# Patient Record
Sex: FEMALE | URBAN - METROPOLITAN AREA
[De-identification: names, ages, dates, MRNs, and addresses within clinical notes are randomized per-mention and may not be internally consistent; named-entity substitution may affect disease eponyms.]

---
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
## 2023-01-10 ENCOUNTER — ATHLETIC TRAINING (OUTPATIENT)
Dept: SPORTS MEDICINE | Facility: OTHER | Age: 18
End: 2023-01-10

## 2023-01-10 DIAGNOSIS — M62.89 MUSCLE TIGHTNESS: Primary | ICD-10-CM

## 2023-01-10 NOTE — PROGRESS NOTES
AT Progress Note    Name: Julio Cesar Garcia  Age: 21 y o  Sport: [employer]  Date of Assessment: 1/10/2023    Assessment/Plan:   Visit Diagnosis: Bilateral medial tibial stress syndrome; Tight left hamstring    Treatment Plan: Continue preventative exercises   []  Follow-up PRN  []  Follow-up prior to next practice/game for re-evaluation  [x]  Daily treatment/rehab  Progress note expected weekly  Referral:   [x]  Not needed at this time  []  Referred to:     Subjective: Ath comes in for treatment of her left hamstring today  She states her shins feel good  Her left hamstring has been tight, and with the meet this weekend she wants to make sure it is 100% prior to competition  Objective:   No new objective measurements  Treatment Log:  Date:  1/10 12/7 12/1   Playing Status:   As tolerated         Exercise/Treatment      TENS and MHP 10min - 20min Lt hamstring   Cupping 10min     Hamstring active ROM completed - Completed      Ath will practice as tolerated today

## 2023-01-13 ENCOUNTER — ATHLETIC TRAINING (OUTPATIENT)
Dept: SPORTS MEDICINE | Facility: OTHER | Age: 18
End: 2023-01-13

## 2023-01-13 DIAGNOSIS — S86.899D MEDIAL TIBIAL STRESS SYNDROME, SUBSEQUENT ENCOUNTER: Primary | ICD-10-CM

## 2023-01-13 NOTE — PROGRESS NOTES
AT Progress Note    Name: Brit Martinez  Age: 21 y o  Sport: [employer]  Date of Assessment: 1/13/2023    Assessment/Plan:   Visit Diagnosis: Bilateral medial tibial stress syndrome    Treatment Plan: Continue preventative exercises   []  Follow-up PRN  []  Follow-up prior to next practice/game for re-evaluation  [x]  Daily treatment/rehab  Progress note expected weekly  Referral:   [x]  Not needed at this time  []  Referred to:     Subjective: Ath comes in for treatment today with competing in a meet this past weekend  She currently has no pain/discomfort  Objective:   No new objective measurements       Treatment Log:  Date:  1/13/23 12/7 12/1 11/30 11/28/22   Playing Status:   As tolerated             Exercise/Treatment        IASTM   5min B/L Shins - - 5min B/L plantar surface   TENS and MHP 20min - 20min Lt hamstring -    4way ankle  - - - 3x10    Line toe walks bent knee 5x with 10# 5x with 8# - 5x with 8# 5x with 8#   Lt posterior hip rotation  - - corrected -   DF self mobs 25x 25x 25x  25x 25x   Lunge soleus calf raises 3x10  3x10 3x10  3x10 3x10    Hamstring active ROM  - Completed  - completed   Wall toe lifts  - - 3x10 -   Hip hikes  - 2x15  2x15 -   Step ups with hip hike 2x15  2x15 - - -   SL pogos   - - - -   Squat jumps   - - - -

## 2023-01-17 ENCOUNTER — ATHLETIC TRAINING (OUTPATIENT)
Dept: SPORTS MEDICINE | Facility: OTHER | Age: 18
End: 2023-01-17

## 2023-01-17 DIAGNOSIS — S86.899D MEDIAL TIBIAL STRESS SYNDROME, SUBSEQUENT ENCOUNTER: Primary | ICD-10-CM

## 2023-01-17 NOTE — PROGRESS NOTES
AT Progress Note    Name: Nicola Em  Age: 21 y o  Sport: Track and [employer]  Date of Assessment: 1/17/2023    Assessment/Plan:   Visit Diagnosis: Bilateral medial tibial stress syndrome    Treatment Plan: Continue preventative exercises   []  Follow-up PRN  []  Follow-up prior to next practice/game for re-evaluation  [x]  Daily treatment/rehab  Progress note expected weekly  Referral:   [x]  Not needed at this time  []  Referred to:     Subjective: Ath comes in for treatment today with competing in a meet this past weekend  She currently has no pain/discomfort  She does have back tightness today  Objective:   No new objective measurements       Treatment Log:  Date:  1/17/23 1/13/23   Playing Status:          Exercise/Treatment     Cupping on back  5min     IASTM  5min B/L    TENS and MHP  20min   4way ankle     Line toe walks bent knee  5x with 10#   Lt posterior hip rotation     DF self mobs  25x   Lunge soleus calf raises  3x10    Hamstring active ROM     Wall toe lifts     Hip hikes     Step ups with hip hike  2x15    SL pogos      Squat jumps

## 2023-01-19 ENCOUNTER — ATHLETIC TRAINING (OUTPATIENT)
Dept: SPORTS MEDICINE | Facility: OTHER | Age: 18
End: 2023-01-19

## 2023-01-19 DIAGNOSIS — M62.89 MUSCLE TIGHTNESS: ICD-10-CM

## 2023-01-19 DIAGNOSIS — S86.899D MEDIAL TIBIAL STRESS SYNDROME, SUBSEQUENT ENCOUNTER: Primary | ICD-10-CM

## 2023-01-19 NOTE — PROGRESS NOTES
AT Progress Note    Name: Catia Lopez  Age: 21 y o  Sport: [employer]  Date of Assessment: 1/19/2023    Assessment/Plan:   Visit Diagnosis: Bilateral medial tibial stress syndrome    Treatment Plan: Continue preventative exercises   []  Follow-up PRN  []  Follow-up prior to next practice/game for re-evaluation  [x]  Daily treatment/rehab  Progress note expected weekly  Referral:   [x]  Not needed at this time  []  Referred to:     Subjective: Ath states her shins started bothering her yesterday and continued into today  Objective:   No new objective measurements       Treatment Log:  Date:  1/19/23 1/17/23 1/13/23   Playing Status:            Exercise/Treatment      Cupping on back  5min  5min     IASTM   5min B/L    TENS and MHP 20min  20min   4way ankle      Line toe walks bent knee   5x with 10#   Lt posterior hip rotation      DF self mobs   25x   Lunge soleus calf raises   3x10    Hamstring active ROM      Wall toe lifts      Hip hikes      Step ups with hip hike   2x15    SL pogos       Squat jumps

## 2023-01-23 ENCOUNTER — ATHLETIC TRAINING (OUTPATIENT)
Dept: SPORTS MEDICINE | Facility: OTHER | Age: 18
End: 2023-01-23

## 2023-01-23 DIAGNOSIS — M62.89 MUSCLE TIGHTNESS: ICD-10-CM

## 2023-01-23 DIAGNOSIS — S86.899D MEDIAL TIBIAL STRESS SYNDROME, SUBSEQUENT ENCOUNTER: Primary | ICD-10-CM

## 2023-01-23 NOTE — PROGRESS NOTES
AT Progress Note    Name: Sergo Boland  Age: 21 y o  Sport: Track and [employer]  Date of Assessment: 1/23/2023    Assessment/Plan:   Visit Diagnosis: Bilateral medial tibial stress syndrome    Treatment Plan: Continue preventative exercises   []  Follow-up PRN  []  Follow-up prior to next practice/game for re-evaluation  [x]  Daily treatment/rehab  Progress note expected weekly  Referral:   [x]  Not needed at this time  []  Referred to:     Subjective: Ath decided not to compete this past weekend because the track was small and she did not have enough space to warm up  Her shins have been bothering her but have remained at 2/10 pain level  She also states her back is still tight, but the cupping last appointment did help  Her back tightness is a 2/10       Objective:   Left posterior rotation- corrected     Treatment Log:  Date:  1/23/23 1/19/23 1/17/23 1/13/23   Playing Status:              Exercise/Treatment       Dead bugs  2x8      SL glute bridge 3x10      Bird dogs 3x10      Cat/cow 3x10      Cupping on back  - 5min  5min     IASTM  -  5min B/L    TENS and MHP - 20min  20min   4way ankle 3x10      Line toe walks bent knee -   5x with 10#   DF self mobs -   25x   Lunge soleus calf raises -   3x10    Hamstring active ROM -      Wall toe lifts -      Hip hikes 2x15       Step ups with hip hike -   2x15    SL pogos  -

## 2023-01-30 ENCOUNTER — ATHLETIC TRAINING (OUTPATIENT)
Dept: SPORTS MEDICINE | Facility: OTHER | Age: 18
End: 2023-01-30

## 2023-01-30 DIAGNOSIS — M62.89 MUSCLE TIGHTNESS: ICD-10-CM

## 2023-01-30 DIAGNOSIS — S86.899D MEDIAL TIBIAL STRESS SYNDROME, SUBSEQUENT ENCOUNTER: Primary | ICD-10-CM

## 2023-01-30 NOTE — PROGRESS NOTES
AT Progress Note    Name: Burley Castleman  Age: 21 y o  Sport: Track and [employer]  Date of Assessment: 1/30/2023    Assessment/Plan:   Visit Diagnosis: Bilateral medial tibial stress syndrome    Treatment Plan: Continue preventative exercises   []  Follow-up PRN  []  Follow-up prior to next practice/game for re-evaluation  [x]  Daily treatment/rehab  Progress note expected weekly  Referral:   [x]  Not needed at this time  []  Referred to:     Subjective: Ath had meet this past weekend, just jumped  Ath felt tightness in shins and back when coming in, pain was 2/10  Ath plans on running in meet this upcoming weekend, possibly not jumping  Not because of pain, but because of possible runway conditions  Objective:   No new objective measurements       Treatment Log:  Date:  1/30/23 1/23/23 1/19/23 1/17/23 1/13/23   Playing Status:                Exercise/Treatment        Dead bugs   2x8      SL glute bridge  3x10      Bird dogs  3x10      Cat/cow  3x10      Cupping on back  5min - 5min  5min     IASTM   -  5min B/L    TENS and MHP 20min  - 20min  20min   4way ankle 3x10 3x10      Line toe walks bent knee  -   5x with 10#   DF self mobs  -   25x   Lunge soleus calf raises  -   3x10    Hamstring active ROM  -      Wall toe lifts  -      Hip hikes 2x15 2x15       Step ups with hip hike  -   2x15    SL pomiss   -        Elzbieta Reaves, ATS

## 2023-02-03 ENCOUNTER — ATHLETIC TRAINING (OUTPATIENT)
Dept: SPORTS MEDICINE | Facility: OTHER | Age: 18
End: 2023-02-03

## 2023-02-03 DIAGNOSIS — M62.89 MUSCLE TIGHTNESS: ICD-10-CM

## 2023-02-03 DIAGNOSIS — S86.899D MEDIAL TIBIAL STRESS SYNDROME, SUBSEQUENT ENCOUNTER: Primary | ICD-10-CM

## 2023-02-03 NOTE — PROGRESS NOTES
AT Progress Note    Name: Francisco Lock  Age: 21 y o  Sport: [employer]  Date of Assessment: 2/3/2023    Assessment/Plan:   Visit Diagnosis: Bilateral medial tibial stress syndrome    Treatment Plan: Continue preventative exercises   []  Follow-up PRN  []  Follow-up prior to next practice/game for re-evaluation  [x]  Daily treatment/rehab  Progress note expected weekly  Referral:   [x]  Not needed at this time  []  Referred to:     Subjective: Ath came in feeling sore  She rated her shin pain a 3/10  Ath requested to do modalities today  Objective:   No new objective measurements       Treatment Log:  Date:  2/3/23 1/30/23 1/23/23 1/19/23 1/17/23 1/13/23   Playing Status:                  Exercise/Treatment         Dead bugs    2x8      SL glute bridge 1x10 (painful)  3x10      Bird dogs 3x10  3x10      Cat/cow   3x10      Cupping on back  5 min 5min - 5min  5min     IASTM    -  5min B/L    TENS and MHP 20 min 20min  - 20min  20min   4way ankle  3x10 3x10      Line toe walks bent knee   -   5x with 10#   DF self mobs   -   25x   Lunge soleus calf raises   -   3x10    Hamstring active ROM   -      Wall toe lifts   -      Hip hikes 2x15 2x15 2x15       Step ups with hip hike   -   2x15    W[place]it calf raise 3x10        2 leg glute bridge 2x10        SL pogos    -        Carmen Skeens, ATS

## 2023-02-06 ENCOUNTER — ATHLETIC TRAINING (OUTPATIENT)
Dept: SPORTS MEDICINE | Facility: OTHER | Age: 18
End: 2023-02-06

## 2023-02-06 DIAGNOSIS — S86.899D MEDIAL TIBIAL STRESS SYNDROME, SUBSEQUENT ENCOUNTER: Primary | ICD-10-CM

## 2023-02-06 NOTE — PROGRESS NOTES
AT Progress Note    Name: Penny Friend  Age: 21 y o  Sport: Track and [employer]  Date of Assessment: 2/6/2023    Assessment/Plan:   Visit Diagnosis: Bilateral medial tibial stress syndrome    Treatment Plan: Continue preventative exercises   []  Follow-up PRN  []  Follow-up prior to next practice/game for re-evaluation  [x]  Daily treatment/rehab  Progress note expected weekly  Referral:   [x]  Not needed at this time  []  Referred to:     Subjective: Ath came in feeling sore  She rated her shin pain a 3/10  Ath requested to do IASTM today  Objective:   No new objective measurements       Treatment Log:  Date:  2/6/23 2/3/23 1/30/23 1/23/23 1/19/23 1/17/23 1/13/23   Playing Status:                    Exercise/Treatment          Dead bugs     2x8      SL glute bridge  1x10 (painful)  3x10      Bird dogs  3x10  3x10      Cat/cow    3x10      Cupping on back   5 min 5min - 5min  5min     IASTM  5min B/L calves    -  5min B/L    TENS and MHP  20 min 20min  - 20min  20min   4way ankle   3x10 3x10      Line toe walks bent knee 3x10 10#   -   5x with 10#   DF self mobs    -   25x   Lunge soleus calf raises    -   3x10    Hamstring active ROM    -      Wall toe lifts    -      Hip hikes 2x15 2x15 2x15 2x15       Step ups with hip hike    -   2x15    W[place]it calf raise 3x10 3x10        2 leg glute bridge  2x10        SL pogos     -

## 2023-02-10 ENCOUNTER — ATHLETIC TRAINING (OUTPATIENT)
Dept: SPORTS MEDICINE | Facility: OTHER | Age: 18
End: 2023-02-10

## 2023-02-10 DIAGNOSIS — M62.89 MUSCLE TIGHTNESS: Primary | ICD-10-CM

## 2023-02-10 NOTE — PROGRESS NOTES
AT Initial Evaluation    Name: Winifred Bianchi  Age: 21 y o  Sport: [employer]  Date of Assessment: 2/10/2023    Assessment/Plan:   Visit Diagnosis: Neck tightness    Treatment Plan:   []  Follow-up PRN  []  Follow-up prior to next practice/game for re-evaluation  [x]  Daily treatment/rehab  Progress note expected weekly  Referral:   [x]  Not needed at this time  []  Referred to:       Subjective: Ath comes into [facility] for treatment of her neck  She states she can feel a knot at the top of her neck on the right side  She also feels tight, but denies pain       Objective:   ROM WNL no pain    Treatment Log:  Date:  2/10/23   Playing Status: Full go        Exercise/Treatment    Heat 10min   Effleruage 5min   Cupping 5min   Trap stretch 2x40s

## 2023-02-14 ENCOUNTER — ATHLETIC TRAINING (OUTPATIENT)
Dept: SPORTS MEDICINE | Facility: OTHER | Age: 18
End: 2023-02-14

## 2023-02-14 DIAGNOSIS — M62.89 MUSCLE TIGHTNESS: Primary | ICD-10-CM

## 2023-02-15 NOTE — PROGRESS NOTES
AT Progress Note    Name: Francisco Lock  Age: 21 y o  Sport: [employer]  Date of Assessment: 2/14/2023    Assessment/Plan:   Visit Diagnosis: Neck tightness    Treatment Plan:   []  Follow-up PRN  []  Follow-up prior to next practice/game for re-evaluation  [x]  Daily treatment/rehab  Progress note expected weekly  Referral:   [x]  Not needed at this time  []  Referred to:       Subjective: Ath comes into [facility] for treatment of her neck  She states it is feeling a lot better  Ath requests cupping today       Objective:   ROM WNL no pain    Treatment Log:  Date:  2/14/23 2/10/23   Playing Status: Full go  Full go         Exercise/Treatment     Heat 10min 10min   Effleruage 5min 5min   Cupping 5min 5min   Trap stretch 2x40s 2x40s

## 2023-02-26 ENCOUNTER — ATHLETIC TRAINING (OUTPATIENT)
Dept: SPORTS MEDICINE | Facility: OTHER | Age: 18
End: 2023-02-26

## 2023-02-26 DIAGNOSIS — M62.89 MUSCLE TIGHTNESS: Primary | ICD-10-CM

## 2023-02-26 NOTE — PROGRESS NOTES
AT Progress Note    Name: Ernestine Bull  Age: 21 y o  Sport: [employer]  Date of Assessment: 2/26/2023    Assessment/Plan:   Visit Diagnosis: Neck tightness    Treatment Plan:   []  Follow-up PRN  []  Follow-up prior to next practice/game for re-evaluation  [x]  Daily treatment/rehab  Progress note expected weekly  Referral:   [x]  Not needed at this time  []  Referred to:       Subjective: Ath comes into [facility] for treatment of her neck  She states it is feeling a lot better  Ath requests cupping today       Objective:   ROM WNL no pain    Treatment Log:  Date:  2/26/23 2/14/23 2/10/23   Playing Status: Full go  Full go  Full go          Exercise/Treatment      Heat 10min 10min 10min   Effleruage - 5min 5min   Cupping 5min 5min 5min   Trap stretch  2x40s 2x40s

## 2023-03-03 ENCOUNTER — ATHLETIC TRAINING (OUTPATIENT)
Dept: SPORTS MEDICINE | Facility: OTHER | Age: 18
End: 2023-03-03

## 2023-03-03 DIAGNOSIS — S86.891D RIGHT MEDIAL TIBIAL STRESS SYNDROME, SUBSEQUENT ENCOUNTER: Primary | ICD-10-CM

## 2023-03-03 NOTE — PROGRESS NOTES
AT Progress Note    Name: Francisco Lock  Age: 21 y o  Sport: [employer] and [employer]  Date of Assessment: 3/3/2023    Assessment/Plan:   Visit Diagnosis: Bilateral medial tibial stress syndrome    Treatment Plan: Continue preventative exercises   []  Follow-up PRN  []  Follow-up prior to next practice/game for re-evaluation  [x]  Daily treatment/rehab  Progress note expected weekly  Referral:   [x]  Not needed at this time  []  Referred to:     Subjective: Pt reported to the [facility] to cont treatment on their shins and arch  Pt stated her shins were bothersome after competing this past weekend, but does not currently have pain  Pt has had off all week  Objective:   No new objective measurements       Treatment Log:  Date:  3/3/23 2/6/23 2/3/23 1/30/23 1/23/23 1/19/23 1/17/23 1/13/23   Playing Status: As Tolerated                     Exercise/Treatment           Dead bugs      2x8      SL glute bridge   1x10 (painful)  3x10      Bird dogs   3x10  3x10      Cat/cow     3x10      Cupping on back    5 min 5min - 5min  5min     IASTM  5min anterior compartment 5min B/L calves    -  5min B/L    TENS and MHP   20 min 20min  - 20min  20min   4way ankle 3x10   3x10 3x10      Line toe walks bent knee 3x10 10# 3x10 10#   -   5x with 10#   DF self mobs completed    -   25x   Lunge soleus calf raises Completed    -   3x10    Hamstring active ROM     -      Wall toe lifts     -      Hip hikes  2x15 2x15 2x15 2x15       Step ups with hip hike     -   2x15    Wallsit calf raise  3x10 3x10        2 leg glute bridge   2x10        SL pogos      -

## 2023-03-13 ENCOUNTER — ATHLETIC TRAINING (OUTPATIENT)
Dept: SPORTS MEDICINE | Facility: OTHER | Age: 18
End: 2023-03-13

## 2023-03-13 DIAGNOSIS — S86.891D RIGHT MEDIAL TIBIAL STRESS SYNDROME, SUBSEQUENT ENCOUNTER: Primary | ICD-10-CM

## 2023-03-13 DIAGNOSIS — M62.89 MUSCLE TIGHTNESS: ICD-10-CM

## 2023-03-13 NOTE — PROGRESS NOTES
AT Progress Note    Name: Doc Lee  Age: 21 y o  Sport: [employer] and [employer]  Date of Assessment: 3/13/2023    Assessment/Plan:   Visit Diagnosis: Bilateral medial tibial stress syndrome; Neck tightness    Treatment Plan: Continue preventative exercises   []  Follow-up PRN  []  Follow-up prior to next practice/game for re-evaluation  [x]  Daily treatment/rehab  Progress note expected weekly  Referral:   [x]  Not needed at this time  []  Referred to:     Subjective: Pt reported to the [facility] to cont treatment on their shins and arch  She got new sneakers over spring break with insoles  She used them in a workout on Thursday and she didn't have any issues  She has not had any shin pain  She does state the right side of her neck still feels tight  She will be doing 4x1 and potentially the 100 for outdoor season  Objective:   No new objective measurements       Treatment Log:  Date:  3/13/23 3/3/23   Playing Status: Full go  As Tolerated        Exercise/Treatment     SL balance  jaswinder disc throwing ball 3x10    Lateral band walks Band around toes  Knees bent   3x    Dead bugs  -    SL glute bridge 2x15 on small step    Bird dogs -    Cat/cow -    Cupping on back  5min moving on right side neck    IASTM  - 5min anterior compartment   TENS and MHP -    4way ankle - 3x10   Line toe walks bent knee - 3x10 10#   DF self mobs - completed   Lunge soleus calf raises - Completed   Hamstring active ROM -    Wall toe lifts -    Hip hikes -    Step ups with hip hike -    Wallsit calf raise -    SL pogos  -

## 2023-03-17 ENCOUNTER — ATHLETIC TRAINING (OUTPATIENT)
Dept: SPORTS MEDICINE | Facility: OTHER | Age: 18
End: 2023-03-17

## 2023-03-17 DIAGNOSIS — S86.891D RIGHT MEDIAL TIBIAL STRESS SYNDROME, SUBSEQUENT ENCOUNTER: Primary | ICD-10-CM

## 2023-03-17 NOTE — PROGRESS NOTES
AT Progress Note    Name: Catia Lopez  Age: 21 y o  Sport: Track and [employer]  Date of Assessment: 3/17/2023    Assessment/Plan:   Visit Diagnosis: Bilateral medial tibial stress syndrome; Neck tightness    Treatment Plan: Continue preventative exercises   []  Follow-up PRN  []  Follow-up prior to next practice/game for re-evaluation  [x]  Daily treatment/rehab  Progress note expected weekly  Referral:   [x]  Not needed at this time  []  Referred to:     Subjective: Pt reported to the [facility] to cont treatment on their shins and arch  She got new sneakers over spring break with insoles  She used them in a workout on Thursday and she didn't have any issues  She has not had any shin pain  She does state the right side of her neck still feels tight  She will be doing 4x1 and potentially the 100 for outdoor season  Objective:   No new objective measurements       Treatment Log:  Date:  3/17/23 3/13/23 3/3/23   Playing Status: Full go  Full go  As Tolerated         Exercise/Treatment      TENS and MHP 20min shins -    Effleurage Neck 10min     DF self mobs Self 25x - completed

## 2023-03-19 ENCOUNTER — ATHLETIC TRAINING (OUTPATIENT)
Dept: SPORTS MEDICINE | Facility: OTHER | Age: 18
End: 2023-03-19

## 2023-03-19 DIAGNOSIS — M62.89 MUSCLE TIGHTNESS: Primary | ICD-10-CM

## 2023-03-19 NOTE — PROGRESS NOTES
Athletic Training Progress Note    Name: Zelda Almonte  Age: 21 y o  Assessment/Plan:     Visit Diagnosis: Neck tightness    Treatment Plan:     [x]  Follow-up PRN  []  Follow-up prior to next practice/game for re-evaluation  []  Daily treatment/rehab  Progress note expected weekly  Subjective: Ath comes in to [facility] [place] with c/o right sided neck tightness  Objective:   Palpate muscle knot along upper trapezius    Treatment Log:  Date: 3/19/23   Playing Status: As tolerated        Exercise/Treatment    MHP 10min   Effleurage  10min   Cupping 5min        Ath felt relief after her treatment today

## 2023-03-20 ENCOUNTER — ATHLETIC TRAINING (OUTPATIENT)
Dept: SPORTS MEDICINE | Facility: OTHER | Age: 18
End: 2023-03-20

## 2023-03-20 DIAGNOSIS — S86.891D RIGHT MEDIAL TIBIAL STRESS SYNDROME, SUBSEQUENT ENCOUNTER: Primary | ICD-10-CM

## 2023-03-20 NOTE — PROGRESS NOTES
AT Progress Note    Name: Sam Perez  Age: 21 y o  Sport: [employer]  Date of Assessment: 3/20/2023    Assessment/Plan:   Visit Diagnosis: Bilateral medial tibial stress syndrome; Neck tightness    Treatment Plan: Continue preventative exercises   []  Follow-up PRN  []  Follow-up prior to next practice/game for re-evaluation  [x]  Daily treatment/rehab  Progress note expected weekly  Referral:   [x]  Not needed at this time  []  Referred to:     Subjective: Pt reported to the [facility] to cont treatment on their shins and arch  Ath was able to complete a full week of sprints with minimal issues  Today she feels sore  Objective:   No new objective measurements       Treatment Log:  Date:  3/20/23 3/17/23 3/13/23 3/3/23   Playing Status: Full go  Full go  Full go  As Tolerated          Exercise/Treatment       TENS and MHP  20min shins -    Effleurage  Neck 10min     Wall sit soleus raises 3x10      Hip hikes  3x10      SL balance  Teresa disc throwing ball 3x10      IASTM 5min B/L      DF self mobs  Self 25x - completed

## 2023-03-24 ENCOUNTER — ATHLETIC TRAINING (OUTPATIENT)
Dept: SPORTS MEDICINE | Facility: OTHER | Age: 18
End: 2023-03-24

## 2023-03-24 DIAGNOSIS — S86.891D RIGHT MEDIAL TIBIAL STRESS SYNDROME, SUBSEQUENT ENCOUNTER: Primary | ICD-10-CM

## 2023-03-24 NOTE — PROGRESS NOTES
AT Progress Note    Name: Lobito Serrano  Age: 21 y o  Sport: [employer]  Date of Assessment: 3/24/2023    Assessment/Plan:   Visit Diagnosis: Bilateral medial tibial stress syndrome; Neck tightness    Treatment Plan: Continue preventative exercises   []  Follow-up PRN  []  Follow-up prior to next practice/game for re-evaluation  [x]  Daily treatment/rehab  Progress note expected weekly  Referral:   [x]  Not needed at this time  []  Referred to:     Subjective: Pt reported to the [facility] to cont treatment on their shins and arch  Ath is starting to feel the same type of pain that she felt before, especially after her workouts  She has been warming up on the turf  When they start doing more aggressive workouts, she will during endurance work on the bike       Objective:   LEFS: 16  Lt DF: 16deg  Rt DF: 18deg     Treatment Log:  Date:  3/24/23 3/20/23 3/17/23 3/13/23 3/3/23   Playing Status: Full go  Full go  Full go  Full go  As Tolerated           Exercise/Treatment        4 way ankle Green band        TENS and MHP 20min   20min shins -    Effleurage   Neck 10min     Wall sit soleus raises  3x10      Hip hikes   3x10      SL balance   Tereas disc throwing ball 3x10      IASTM  5min B/L      DF self mobs Self 25x   Self 25x - completed

## 2023-03-27 ENCOUNTER — ATHLETIC TRAINING (OUTPATIENT)
Dept: SPORTS MEDICINE | Facility: OTHER | Age: 18
End: 2023-03-27

## 2023-03-27 DIAGNOSIS — S86.891D RIGHT MEDIAL TIBIAL STRESS SYNDROME, SUBSEQUENT ENCOUNTER: Primary | ICD-10-CM

## 2023-03-27 NOTE — PROGRESS NOTES
AT Progress Note    Name: Thuy Barone  Age: 21 y o  Sport: [employer]  Date of Assessment: 3/27/2023    Assessment/Plan:   Visit Diagnosis: Bilateral medial tibial stress syndrome; Neck tightness    Treatment Plan: Continue preventative exercises   []  Follow-up PRN  []  Follow-up prior to next practice/game for re-evaluation  [x]  Daily treatment/rehab  Progress note expected weekly  Referral:   [x]  Not needed at this time  []  Referred to:     Subjective: Pt reported to the [facility] to cont treatment on their shins and arch  Ath is starting to feel the same type of pain that she felt before, especially after her workouts  She has been warming up on the turf  When they start doing more aggressive workouts, she will during endurance work on the bike  Ath noticed her shins didn't bother her this morning when she was walking down the stairs       Objective:   LEFS: 11  Lt DF: 16deg  Rt DF: 18deg     Treatment Log:  Date:  3/27/23 3/24/23 3/20/23 3/17/23 3/13/23 3/3/23   Playing Status:  Full go  Full go  Full go  Full go  As Tolerated            Exercise/Treatment         4 way ankle 2x10 blue band  Green band        TENS and MHP - 20min   20min shins -    Effleurage -   Neck 10min     Wall sit soleus raises 3x10  3x10      Hip hikes  2x8  3x10      SL balance  -  Teresa disc throwing ball 3x10      IASTM 3min B/L   5min B/L      DF self mobs  Self 25x   Self 25x - completed

## 2023-03-31 ENCOUNTER — ATHLETIC TRAINING (OUTPATIENT)
Dept: SPORTS MEDICINE | Facility: OTHER | Age: 18
End: 2023-03-31

## 2023-03-31 DIAGNOSIS — S86.891D RIGHT MEDIAL TIBIAL STRESS SYNDROME, SUBSEQUENT ENCOUNTER: Primary | ICD-10-CM

## 2023-03-31 NOTE — PROGRESS NOTES
AT Progress Note    Name: Shelby Espino  Age: 21 y o  Sport: Track and [employer]  Date of Assessment: 3/31/2023    Assessment/Plan:   Visit Diagnosis: Bilateral medial tibial stress syndrome; Neck tightness    Treatment Plan: Continue preventative exercises   []  Follow-up PRN  []  Follow-up prior to next practice/game for re-evaluation  [x]  Daily treatment/rehab  Progress note expected weekly  Referral:   [x]  Not needed at this time  []  Referred to:     Subjective: Pt reported to the [facility] to cont treatment on their shins and arch  Ath is starting to feel the same type of pain that she felt before, especially after her workouts  She has been warming up on the turf  When they start doing more aggressive workouts, she will during endurance work on the bike  Ath didn't have any pain with her workout yesterday       Objective:   LEFS: 11  Lt DF: 16deg  Rt DF: 18deg     Treatment Log:  Date:  3/31/23 3/27/23 3/24/23 3/20/23 3/17/23 3/13/23 3/3/23   Playing Status:   Full go  Full go  Full go  Full go  As Tolerated             Exercise/Treatment          4 way ankle  2x10 blue band  Green band        TENS and MHP 20min B/L shins - 20min   20min shins -    Effleurage  -   Neck 10min     Wall sit soleus raises  3x10  3x10      Hip hikes   2x8  3x10      SL balance   -  Teresa disc throwing ball 3x10      IASTM 5min B/L arches 3min B/L   5min B/L      DF self mobs   Self 25x   Self 25x - completed

## 2023-04-02 ENCOUNTER — ATHLETIC TRAINING (OUTPATIENT)
Dept: SPORTS MEDICINE | Facility: OTHER | Age: 18
End: 2023-04-02

## 2023-04-02 DIAGNOSIS — S86.891D RIGHT MEDIAL TIBIAL STRESS SYNDROME, SUBSEQUENT ENCOUNTER: Primary | ICD-10-CM

## 2023-04-02 NOTE — PROGRESS NOTES
AT Progress Note    Name: Raza Bean  Age: 21 y o  Sport: Track and [employer]  Date of Assessment: 4/2/2023    Assessment/Plan:   Visit Diagnosis: Bilateral medial tibial stress syndrome; Neck tightness    Treatment Plan: Continue preventative exercises   []  Follow-up PRN  []  Follow-up prior to next practice/game for re-evaluation  [x]  Daily treatment/rehab  Progress note expected weekly  Referral:   [x]  Not needed at this time  []  Referred to:     Subjective: Pt reported to the [facility] to cont treatment on their shins and arch  Ath is starting to feel the same type of pain that she felt before, especially after her workouts  She has been warming up on the turf  When they start doing more aggressive workouts, she will during endurance work on the bike       Objective:   LEFS: 11  Lt DF: 19deg  Rt DF: 18deg     Treatment Log:  Date:  4/2/23 3/31/23 3/27/23 3/24/23 3/20/23 3/17/23 3/13/23 3/3/23   Playing Status: As tolerated   Full go  Full go  Full go  Full go  As Tolerated              Exercise/Treatment           4 way ankle 3x10 blue band  2x10 blue band  Green band        Effleurage 5min B/L shins and PF  -   Neck 10min     Plantar fascia ball rolls 5min B/L          Hip hikes  3x10  2x8  3x10      DF self mobs 25x self   Self 25x   Self 25x - completed

## 2023-04-03 ENCOUNTER — ATHLETIC TRAINING (OUTPATIENT)
Dept: SPORTS MEDICINE | Facility: OTHER | Age: 18
End: 2023-04-03

## 2023-04-03 DIAGNOSIS — M62.89 MUSCLE TIGHTNESS: ICD-10-CM

## 2023-04-03 DIAGNOSIS — S86.891D RIGHT MEDIAL TIBIAL STRESS SYNDROME, SUBSEQUENT ENCOUNTER: Primary | ICD-10-CM

## 2023-04-03 NOTE — PROGRESS NOTES
AT Progress Note    Name: Connie Beckett  Age: 21 y o  Sport: Track and [employer]  Date of Assessment: 4/3/2023    Assessment/Plan:   Visit Diagnosis: Bilateral medial tibial stress syndrome; Neck tightness    Treatment Plan: Continue preventative exercises   []  Follow-up PRN  []  Follow-up prior to next practice/game for re-evaluation  [x]  Daily treatment/rehab  Progress note expected weekly  Referral:   [x]  Not needed at this time  []  Referred to:     Subjective: Pt reported to the [facility] to cont treatment on their shins and arch  Ath is starting to feel the same type of pain that she felt before, especially after her workouts  She has been warming up on the turf  When they start doing more aggressive workouts, she will during endurance work on the bike       Objective:   LEFS: 11  Lt DF: 19deg  Rt DF: 18deg     Treatment Log:  Date:  4/3/23 4/2/23 3/31/23 3/27/23 3/24/23 3/20/23 3/17/23 3/13/23 3/3/23   Playing Status: As tolerated As tolerated   Full go  Full go  Full go  Full go  As Tolerated               Exercise/Treatment            4 way ankle  3x10 blue band  2x10 blue band  Green band        Effleurage  5min B/L shins and PF  -   Neck 10min     Plantar fascia ball rolls  5min B/L          Hip hikes   3x10  2x8  3x10      Cupping 5min neck           Muscle relaxation TENS 20min with heat           DF self mobs  25x self   Self 25x   Self 25x - completed

## 2023-04-24 ENCOUNTER — ATHLETIC TRAINING (OUTPATIENT)
Dept: SPORTS MEDICINE | Facility: OTHER | Age: 18
End: 2023-04-24

## 2023-04-24 DIAGNOSIS — M62.89 MUSCLE TIGHTNESS: Primary | ICD-10-CM

## 2023-04-24 NOTE — PROGRESS NOTES
Athletic Training Progress Note     Name: Momo Erazo  Age: 21 y o    School District: [employer]  Sport: Track and field  Date of Assessment: 4/24/2023     Assessment/Plan:      Visit Diagnosis: Muscle tightness     Treatment Plan: decrease pain, increase mobility    []  Follow-up PRN  []  Follow-up prior to next practice/game for re-evaluation  [x]  Daily treatment/rehab  Progress note expected weekly  Referral:      []  Not needed at this time  []  Referred to:      [x]  Coaching staff notified  []  Parent/Guardian Notified      Subjective: Pt has no complaints  Objective:  See below         Treatment Log:     Date: 4/24/23 4/19/23 4/17/23   Playing Status: Full go Full go  Full go           Exercise/Treatment       Hamstring release 2x15 with ball  2x15 with ball    Slide board lunges 2x15     Glute bridge with march 3x10 green band      Nerve glides  2x15    MHP  -  completed   SL RDL  -  3x10   Ball squeeze with glute bridge and knee extension  2x5 B/L  2x5 B/L   Cupping    completed    Muscle relaxation TENS  15min with heat      IASTM Left hamstring

## 2023-05-03 ENCOUNTER — ATHLETIC TRAINING (OUTPATIENT)
Dept: SPORTS MEDICINE | Facility: OTHER | Age: 18
End: 2023-05-03

## 2023-05-03 DIAGNOSIS — M62.89 MUSCLE TIGHTNESS: Primary | ICD-10-CM

## 2023-05-03 NOTE — PROGRESS NOTES
Athletic Training Progress Note     Name: Mathew Venegas  Age: 21 y o    School District: [employer]  Sport: Track and field  Date of Assessment: 5/3/2023     Assessment/Plan:      Visit Diagnosis: Muscle tightness     Treatment Plan: decrease pain, increase mobility    []  Follow-up PRN  []  Follow-up prior to next practice/game for re-evaluation  [x]  Daily treatment/rehab  Progress note expected weekly  Referral:      []  Not needed at this time  []  Referred to:      [x]  Coaching staff notified  []  Parent/Guardian Notified      Subjective: Pt left hamstring is sore today  She will be competing Friday in [facility]  Objective:  See below         Treatment Log:     Date: 5/3/23 4/24/23 4/19/23  4/17/23   Playing Status: Full go Full go Full go  Full go            Exercise/Treatment        Elephant scoops 3x      SL glute bridges 3x10      Weighted hip extensions 3x10      Wall sit 2x45s      Effleurage 10min

## 2023-08-30 ENCOUNTER — ATHLETIC TRAINING (OUTPATIENT)
Dept: SPORTS MEDICINE | Facility: OTHER | Age: 18
End: 2023-08-30

## 2023-08-30 DIAGNOSIS — S86.899A MEDIAL TIBIAL STRESS SYNDROME, INITIAL ENCOUNTER: Primary | ICD-10-CM

## 2023-08-30 NOTE — PROGRESS NOTES
AT Initial Evaluation    Name: Vicente Salas  Age: 19 y.o. Sport:   Date of Assessment: 8/30/2023    Assessment/Plan:   Visit Diagnosis: No primary diagnosis found. Treatment Plan:   []  Follow-up PRN. []  Follow-up prior to next practice/game for re-evaluation. [x]  Daily treatment/rehab. Progress note expected weekly. Referral:   [x]  Not needed at this time  []  Referred to:       Subjective: Ath comes in to continue prevention exercises for shin splints. She bought new shoes with inserts and started wearing them for practice. Objective:   See below. Treatment Log:  Date:  8/30/23   Playing Status: Full go        Exercise/Treatment    4 way ankle 2x15 black band   Hip hikes 3x10   Ant tib raises 3x10    Soleus walks  5x 10#   SL balance 3x30s jaswinder disc         Ath will continue appointments regularly.

## 2023-09-06 ENCOUNTER — ATHLETIC TRAINING (OUTPATIENT)
Dept: SPORTS MEDICINE | Facility: OTHER | Age: 18
End: 2023-09-06

## 2023-09-06 DIAGNOSIS — S86.899A MEDIAL TIBIAL STRESS SYNDROME, INITIAL ENCOUNTER: Primary | ICD-10-CM

## 2023-09-07 NOTE — PROGRESS NOTES
Athletic Training Progress Note    Name: Emma Granados  Age: 19 y.o. Assessment/Plan:     Visit Diagnosis: Medial tibial stress syndrome, initial encounter [I08.835K]    Treatment Plan: See below    []  Follow-up PRN. []  Follow-up prior to next practice/game for re-evaluation. [x]  Daily treatment/rehab. Progress note expected weekly. Subjective: Ath reports to the clinic today to work on her shins. She states that the inserts are helping, but is worried that it will be temporary. She has not had very many problems. Objective:   n/a    Treatment Log:     Date: 9/6/23   Playing Status: Full go       Exercise/Treatment    3 way ankle stretch 3x45s   Anterior Tib stretch 3x45s                                          She also heated and received IASTM on the right side of her occiput. She will cont to make appointments.

## 2023-09-12 ENCOUNTER — ATHLETIC TRAINING (OUTPATIENT)
Dept: SPORTS MEDICINE | Facility: OTHER | Age: 18
End: 2023-09-12

## 2023-09-12 DIAGNOSIS — S86.899S MEDIAL TIBIAL STRESS SYNDROME, SEQUELA: Primary | ICD-10-CM

## 2023-09-12 NOTE — PROGRESS NOTES
Athletic Training Progress Note    Name: Mekhi Vazquez  Age: 19 y.o. Assessment/Plan:     Visit Diagnosis: MTSS    Treatment Plan: See below    []  Follow-up PRN. []  Follow-up prior to next practice/game for re-evaluation. [x]  Daily treatment/rehab. Progress note expected weekly. Subjective: Ath reports to the clinic today to work on her shins. She states that the inserts are helping, but is worried that it will be temporary. She has not had very many problems. Ath requests cupping on her low back and IASTM on B/L plantar fascia today.      Objective:   Left posterior rotation fixed with MET     Treatment Log:     Date: 9/12/23 9/6/23   Playing Status:  Full go        Exercise/Treatment     3 way ankle stretch  3x45s   Anterior Tib stretch  3x45s   Glute bridge with march 2x15 green band    Fire hydrants 2x15 green band     Cupping low back 5min    IASTM B/L plantar fascia 3min B/L

## 2023-09-26 ENCOUNTER — ATHLETIC TRAINING (OUTPATIENT)
Dept: SPORTS MEDICINE | Facility: OTHER | Age: 18
End: 2023-09-26

## 2023-09-26 DIAGNOSIS — M62.89 MUSCLE TIGHTNESS: Primary | ICD-10-CM

## 2023-09-26 NOTE — PROGRESS NOTES
S: Karol Younger states she is feeling good, her feet haven't bothered her and her shins haven't either. She does c/o back tightness. O: Bilateral back tightness  A: Muscle tightness  P: Ath completed 4way ankle, tib anterior activation, and cupping on low back with child pose positions. Her hips were corrected with MET.   BD, ATC

## 2023-10-11 ENCOUNTER — ATHLETIC TRAINING (OUTPATIENT)
Dept: SPORTS MEDICINE | Facility: OTHER | Age: 18
End: 2023-10-11

## 2023-10-11 DIAGNOSIS — S86.899S MEDIAL TIBIAL STRESS SYNDROME, SEQUELA: ICD-10-CM

## 2023-10-11 DIAGNOSIS — M62.89 MUSCLE TIGHTNESS: Primary | ICD-10-CM

## 2023-10-11 NOTE — PROGRESS NOTES
S: Jerman Obrien states she is feeling good, her feet haven't bothered her, but does state she has some soreness in her shins. She does c/o back tightness. O: Bilateral back tightness  A: Muscle tightness  P: Ath completed eccentric calf raises, tib anterior activation, back exercises including knee windshield wipers, prone extensions, IASTM on B/L shins, and cupping on low back with child pose positions.    BD, ATC

## 2023-10-15 ENCOUNTER — ATHLETIC TRAINING (OUTPATIENT)
Dept: SPORTS MEDICINE | Facility: OTHER | Age: 18
End: 2023-10-15

## 2023-10-15 DIAGNOSIS — S86.899S MEDIAL TIBIAL STRESS SYNDROME, SEQUELA: Primary | ICD-10-CM

## 2023-10-15 NOTE — PROGRESS NOTES
S: Niels Dyer states she feels her shin pain coming back. She feels it going up and down the stairs. She denies pain with walking. She states her right is worse than left. O: TTP distal tibia   A: Medial tibial stress syndrome   P: Treatment:   -CUS  - 2x15 tib anterior raises   - IASTM B/L plantar fascia    Completed CUS treatment for next 7 treatments with athlete.

## 2023-10-18 ENCOUNTER — ATHLETIC TRAINING (OUTPATIENT)
Dept: SPORTS MEDICINE | Facility: OTHER | Age: 18
End: 2023-10-18

## 2023-10-18 DIAGNOSIS — S86.899S MEDIAL TIBIAL STRESS SYNDROME, SEQUELA: Primary | ICD-10-CM

## 2023-10-22 ENCOUNTER — ATHLETIC TRAINING (OUTPATIENT)
Dept: SPORTS MEDICINE | Facility: OTHER | Age: 18
End: 2023-10-22

## 2023-10-22 DIAGNOSIS — S86.899S MEDIAL TIBIAL STRESS SYNDROME, SEQUELA: Primary | ICD-10-CM

## 2023-10-22 NOTE — PROGRESS NOTES
10/22/23  Completed CUS #3 and rehab exercises    10/18/23  CUS treatment #2 was completed followed by tib ant raises.

## 2023-10-24 ENCOUNTER — ATHLETIC TRAINING (OUTPATIENT)
Dept: SPORTS MEDICINE | Facility: OTHER | Age: 18
End: 2023-10-24

## 2023-10-24 DIAGNOSIS — S86.899S MEDIAL TIBIAL STRESS SYNDROME, SEQUELA: Primary | ICD-10-CM

## 2023-10-24 NOTE — PROGRESS NOTES
S: Hannah Ding states she feels her shin pain coming back. Ath states "I'm not bad, but I'm not good". Pain with going up and down the stairs. Ath has been cross training on her own. She denies pain with walking. She states her right is worse than left. O: TTP distal tibia   A: Medial tibial stress syndrome   P: Treatment:   -Foam houses   -Towel scrunches  -CUS #4  - 2x15 tib anterior raises   - IASTM B/L plantar fascia    Completed CUS treatment for next 7 treatments with athlete.

## 2023-11-02 ENCOUNTER — ATHLETIC TRAINING (OUTPATIENT)
Dept: SPORTS MEDICINE | Facility: OTHER | Age: 18
End: 2023-11-02

## 2023-11-02 DIAGNOSIS — S86.899S MEDIAL TIBIAL STRESS SYNDROME, SEQUELA: Primary | ICD-10-CM

## 2023-11-02 NOTE — PROGRESS NOTES
S: Judy Bhandari states she feels her shin pain coming back. Ath states "I'm not bad, but I'm not good". Pain with going up and down the stairs. Ath has been cross training on her own. She denies pain with walking. She states her right is worse than left. O: TTP distal tibia   A: Medial tibial stress syndrome   P: Treatment:   -Foam houses   -Towel scrunches  -CUS #5  - 2x15 tib anterior raises     Completed CUS treatment for next 7 treatments with athlete.

## 2023-11-05 ENCOUNTER — ATHLETIC TRAINING (OUTPATIENT)
Dept: SPORTS MEDICINE | Facility: OTHER | Age: 18
End: 2023-11-05

## 2023-11-05 DIAGNOSIS — S86.899S MEDIAL TIBIAL STRESS SYNDROME, SEQUELA: Primary | ICD-10-CM

## 2023-11-05 NOTE — PROGRESS NOTES
S: Love Mimes states she feels her shin pain coming back. Ath states "I'm not bad, but I'm not good". Pain with going up and down the stairs. Ath has been cross training on her own. She denies pain with walking. She states her right is worse than left. O: TTP distal tibia   A: Medial tibial stress syndrome   P: Treatment:   -4 way ankle black band  -DF Wall touches  -Towel scrunches  -CUS #6  - 2x15 tib anterior raises     Completed CUS treatment for next 7 treatments with athlete.

## 2023-11-07 ENCOUNTER — ATHLETIC TRAINING (OUTPATIENT)
Dept: SPORTS MEDICINE | Facility: OTHER | Age: 18
End: 2023-11-07

## 2023-11-07 DIAGNOSIS — S86.899S MEDIAL TIBIAL STRESS SYNDROME, SEQUELA: Primary | ICD-10-CM

## 2023-11-07 NOTE — PROGRESS NOTES
S: Stephanie Loomis states she feels her shin pain coming back. Ath states "I'm not bad, but I'm not good". Pain with going up and down the stairs. Ath has been cross training on her own. She denies pain with walking. She states her right is worse than left.   O: TTP distal tibia   A: Medial tibial stress syndrome   P: Treatment:   -CUS #7  -2x15 tib anterior raises  -Gastroc and soleus stretch  -Calf raises

## 2023-11-09 ENCOUNTER — ATHLETIC TRAINING (OUTPATIENT)
Dept: SPORTS MEDICINE | Facility: OTHER | Age: 18
End: 2023-11-09

## 2023-11-09 DIAGNOSIS — S86.899S MEDIAL TIBIAL STRESS SYNDROME, SEQUELA: Primary | ICD-10-CM

## 2023-11-09 DIAGNOSIS — M62.89 MUSCLE TIGHTNESS: ICD-10-CM

## 2023-11-09 NOTE — PROGRESS NOTES
11/9/23  S: Donette Harada comes in for treatment of her shins. Stairs are bothersome only going down. She requests heat and stim. O: TTP distal tibia   A: Medial tibial stress syndrome   P: Treatment: Muscle relaxation and heat, cupping on right upper trap    11/7/23  S: Donette Harada states she feels her shin pain coming back. Ath states "I'm not bad, but I'm not good". Pain with going up and down the stairs. Ath has been cross training on her own. She denies pain with walking. She states her right is worse than left.   O: TTP distal tibia   A: Medial tibial stress syndrome   P: Treatment:   -CUS #7  -2x15 tib anterior raises  -Gastroc and soleus stretch  -Calf raises

## 2023-11-15 ENCOUNTER — ATHLETIC TRAINING (OUTPATIENT)
Dept: SPORTS MEDICINE | Facility: OTHER | Age: 18
End: 2023-11-15

## 2023-11-15 DIAGNOSIS — S86.899S MEDIAL TIBIAL STRESS SYNDROME, SEQUELA: Primary | ICD-10-CM

## 2023-11-15 NOTE — PROGRESS NOTES
11/15/23  S: Simon Florentino comes in for treatment of her shins. Stairs are bothersome only going down. She requests heat and stim. O: TTP distal tibia   A: Medial tibial stress syndrome   P: Treatment: IASTM on B/L shins, ant tib activation. 11/9/23  S: Simon Florentino comes in for treatment of her shins. Stairs are bothersome only going down. She requests heat and stim. O: TTP distal tibia   A: Medial tibial stress syndrome   P: Treatment: Muscle relaxation and heat, cupping on right upper trap    11/7/23  S: Simon Florentino states she feels her shin pain coming back. Ath states "I'm not bad, but I'm not good". Pain with going up and down the stairs. Ath has been cross training on her own. She denies pain with walking. She states her right is worse than left.   O: TTP distal tibia   A: Medial tibial stress syndrome   P: Treatment:   -CUS #7  -2x15 tib anterior raises  -Gastroc and soleus stretch  -Calf raises

## 2023-11-16 ENCOUNTER — ATHLETIC TRAINING (OUTPATIENT)
Dept: SPORTS MEDICINE | Facility: OTHER | Age: 18
End: 2023-11-16

## 2023-11-16 DIAGNOSIS — S86.899S MEDIAL TIBIAL STRESS SYNDROME, SEQUELA: Primary | ICD-10-CM

## 2023-11-16 NOTE — PROGRESS NOTES
11/16/23  S: Coleman comes in for treatment of her shins. Stairs are bothersome. She describes going up stairs as "burning" and going down as just painful. She is wearing compression sleeves for practices. Ath will try arch taping for practices. O: TTP distal tibia   A: Medial tibial stress syndrome   P: DF wall touches, Soleus raises with weighted vest, Foam houses, Great toe lifts, Toe spreads    11/15/23  S: Jahaira Cruz comes in for treatment of her shins. Stairs are bothersome only going down. She requests heat and stim. O: TTP distal tibia   A: Medial tibial stress syndrome   P: Treatment: IASTM on B/L shins, ant tib activation. 11/9/23  S: Jahaira Cruz comes in for treatment of her shins. Stairs are bothersome only going down. She requests heat and stim. O: TTP distal tibia   A: Medial tibial stress syndrome   P: Treatment: Muscle relaxation and heat, cupping on right upper trap    11/7/23  S: Jahaira Cruz states she feels her shin pain coming back. Ath states "I'm not bad, but I'm not good". Pain with going up and down the stairs. Ath has been cross training on her own. She denies pain with walking. She states her right is worse than left.   O: TTP distal tibia   A: Medial tibial stress syndrome   P: Treatment:   -CUS #7  -2x15 tib anterior raises  -Gastroc and soleus stretch  -Calf raises

## 2023-11-21 ENCOUNTER — ATHLETIC TRAINING (OUTPATIENT)
Dept: SPORTS MEDICINE | Facility: OTHER | Age: 18
End: 2023-11-21

## 2023-11-21 DIAGNOSIS — S86.899S MEDIAL TIBIAL STRESS SYNDROME, SEQUELA: Primary | ICD-10-CM

## 2023-11-21 DIAGNOSIS — M62.89 MUSCLE TIGHTNESS: ICD-10-CM

## 2023-11-21 NOTE — PROGRESS NOTES
11/21/23  S: Ath requests cupping on her back and IASTM on plantar surfaces. Ath states the arch taping helps her feet from "burning" after her warmup laps. O: TTP distal tibia   A: Medial tibial stress syndrome   P: Great toe lifts, Toe spreads, Cupping on back, IASTM on plantar surfaces     11/16/23  S: Cinthya Sánchez comes in for treatment of her shins. Stairs are bothersome. She describes going up stairs as "burning" and going down as just painful. She is wearing compression sleeves for practices. Ath will try arch taping for practices. O: TTP distal tibia   A: Medial tibial stress syndrome   P: DF wall touches, Soleus raises with weighted vest, Foam houses, Great toe lifts, Toe spreads    11/15/23  S: Cinthya Sánchez comes in for treatment of her shins. Stairs are bothersome only going down. She requests heat and stim. O: TTP distal tibia   A: Medial tibial stress syndrome   P: Treatment: IASTM on B/L shins, ant tib activation. 11/9/23  S: Cinthya Sánchez comes in for treatment of her shins. Stairs are bothersome only going down. She requests heat and stim. O: TTP distal tibia   A: Medial tibial stress syndrome   P: Treatment: Muscle relaxation and heat, cupping on right upper trap    11/7/23  S: Cinthya Sánchez states she feels her shin pain coming back. Ath states "I'm not bad, but I'm not good". Pain with going up and down the stairs. Ath has been cross training on her own. She denies pain with walking. She states her right is worse than left.   O: TTP distal tibia   A: Medial tibial stress syndrome   P: Treatment:   -CUS #7  -2x15 tib anterior raises  -Gastroc and soleus stretch  -Calf raises

## 2023-11-27 ENCOUNTER — ATHLETIC TRAINING (OUTPATIENT)
Dept: SPORTS MEDICINE | Facility: OTHER | Age: 18
End: 2023-11-27

## 2023-11-27 DIAGNOSIS — S86.899S MEDIAL TIBIAL STRESS SYNDROME, SEQUELA: Primary | ICD-10-CM

## 2023-11-27 NOTE — PROGRESS NOTES
11/27/23  S: Ath completed practice indoors on Tuesday and didn't have pain with that. She felt it slightly, but nothing that she wasn't able to finish the practice. She has been lowering her reps towards the end of the week and bikes on Fridays. O: TTP left mid tibia. TTP right distal tibia   A: Medial tibial stress syndrome   P: Banded toe steps, hip hikes with weight, CUS completed    11/21/23  S: Ath requests cupping on her back and IASTM on plantar surfaces. Ath states the arch taping helps her feet from "burning" after her warmup laps. O: TTP distal tibia   A: Medial tibial stress syndrome   P: Great toe lifts, Toe spreads, Cupping on back, IASTM on plantar surfaces     11/16/23  S: Stephanie Loomis comes in for treatment of her shins. Stairs are bothersome. She describes going up stairs as "burning" and going down as just painful. She is wearing compression sleeves for practices. Ath will try arch taping for practices. O: TTP distal tibia   A: Medial tibial stress syndrome   P: DF wall touches, Soleus raises with weighted vest, Foam houses, Great toe lifts, Toe spreads    11/15/23  S: Stephanie Loomis comes in for treatment of her shins. Stairs are bothersome only going down. She requests heat and stim. O: TTP distal tibia   A: Medial tibial stress syndrome   P: Treatment: IASTM on B/L shins, ant tib activation. 11/9/23  S: Stephanie Loomis comes in for treatment of her shins. Stairs are bothersome only going down. She requests heat and stim. O: TTP distal tibia   A: Medial tibial stress syndrome   P: Treatment: Muscle relaxation and heat, cupping on right upper trap    11/7/23  S: Stephanie Loomis states she feels her shin pain coming back. Ath states "I'm not bad, but I'm not good". Pain with going up and down the stairs. Ath has been cross training on her own. She denies pain with walking. She states her right is worse than left.   O: TTP distal tibia   A: Medial tibial stress syndrome   P: Treatment:   -CUS #7  -2x15 tib anterior raises  -Gastroc and soleus stretch  -Calf raises

## 2023-11-29 ENCOUNTER — ATHLETIC TRAINING (OUTPATIENT)
Dept: SPORTS MEDICINE | Facility: OTHER | Age: 18
End: 2023-11-29

## 2023-11-29 DIAGNOSIS — S86.899S MEDIAL TIBIAL STRESS SYNDROME, SEQUELA: Primary | ICD-10-CM

## 2023-11-29 NOTE — PROGRESS NOTES
11/29/23  S: Ath requests cupping along her back and heat/stim on both shins. O: TTP left mid tibia. TTP right distal tibia   A: Medial tibial stress syndrome   P: Cupping on back/neck, muscle relaxation TENS 20min with heat    11/28/23  S: Ath completed practice indoors on Tuesday and didn't have pain with that. She felt it slightly, but nothing that she wasn't able to finish the practice. She has been lowering her reps towards the end of the week and bikes on Fridays. O: TTP left mid tibia. TTP right distal tibia   A: Medial tibial stress syndrome   P: Banded toe steps, hip hikes with weight, CUS completed    11/27/23  S: Carolina Hopkins completed practice indoors on Tuesday and didn't have pain with that. She felt it slightly, but nothing that she wasn't able to finish the practice. She has been lowering her reps towards the end of the week and bikes on Fridays. O: TTP left mid tibia. TTP right distal tibia   A: Medial tibial stress syndrome   P: Banded toe steps, hip hikes with weight, CUS completed    11/21/23  S: Ath requests cupping on her back and IASTM on plantar surfaces. Ath states the arch taping helps her feet from "burning" after her warmup laps. O: TTP distal tibia   A: Medial tibial stress syndrome   P: Great toe lifts, Toe spreads, Cupping on back, IASTM on plantar surfaces     11/16/23  S: Carolina Hopkins comes in for treatment of her shins. Stairs are bothersome. She describes going up stairs as "burning" and going down as just painful. She is wearing compression sleeves for practices. Ath will try arch taping for practices. O: TTP distal tibia   A: Medial tibial stress syndrome   P: DF wall touches, Soleus raises with weighted vest, Foam houses, Great toe lifts, Toe spreads    11/15/23  S: Carolina Hopkins comes in for treatment of her shins. Stairs are bothersome only going down. She requests heat and stim.    O: TTP distal tibia   A: Medial tibial stress syndrome   P: Treatment: IASTM on B/L shins, ant tib activation. 11/9/23  S: Valarie Bruno comes in for treatment of her shins. Stairs are bothersome only going down. She requests heat and stim. O: TTP distal tibia   A: Medial tibial stress syndrome   P: Treatment: Muscle relaxation and heat, cupping on right upper trap    11/7/23  S: Valarie Bruno states she feels her shin pain coming back. Ath states "I'm not bad, but I'm not good". Pain with going up and down the stairs. Ath has been cross training on her own. She denies pain with walking. She states her right is worse than left.   O: TTP distal tibia   A: Medial tibial stress syndrome   P: Treatment:   -CUS #7  -2x15 tib anterior raises  -Gastroc and soleus stretch  -Calf raises

## 2023-12-04 ENCOUNTER — ATHLETIC TRAINING (OUTPATIENT)
Dept: SPORTS MEDICINE | Facility: OTHER | Age: 18
End: 2023-12-04

## 2023-12-04 DIAGNOSIS — S86.899S MEDIAL TIBIAL STRESS SYNDROME, SEQUELA: Primary | ICD-10-CM

## 2023-12-04 NOTE — PROGRESS NOTES
Athletic Training Progress Note    Name: Mee Irizarry  Age: 19 y.o. Assessment/Plan:     Visit Diagnosis: Medial tibial stress syndrome     Treatment Plan:     []  Follow-up PRN. []  Follow-up prior to next practice/game for re-evaluation. [x]  Daily treatment/rehab. Progress note expected weekly. Subjective: Friday during pre-meet ath states she was feeling pain in her shins. Normally on Friday's she does elliptical, so she doesn't think her body was used to the extra day of sprints. Saturday she states she was more sore. Her pain did not go above a 4/10.      Objective:  Left anterior rotation corrected with MET     Treatment Log:  Date: 12/4/23   Playing Status: Full go        Exercise/Treatment    [place] pick ups 1x each foot   IASTM B/L shins   IASTM  B/L plantar surfaces

## 2023-12-06 ENCOUNTER — ATHLETIC TRAINING (OUTPATIENT)
Dept: SPORTS MEDICINE | Facility: OTHER | Age: 18
End: 2023-12-06

## 2023-12-06 DIAGNOSIS — M62.89 MUSCLE TIGHTNESS: Primary | ICD-10-CM

## 2023-12-06 NOTE — PROGRESS NOTES
Athletic Training Progress Note    Name: Sue Thornton  Age: 19 y.o. Assessment/Plan:     Visit Diagnosis: Medial tibial stress syndrome     Treatment Plan:     []  Follow-up PRN. []  Follow-up prior to next practice/game for re-evaluation. [x]  Daily treatment/rehab. Progress note expected weekly. Subjective: Friday during pre-meet ath states she was feeling pain in her shins. Normally on Friday's she does elliptical, so she doesn't think her body was used to the extra day of sprints. Saturday she states she was more sore. Her pain did not go above a 4/10. Ath requests cupping today for her low back. Objective:  No hip misalignment.      Treatment Log:  Date: 12/6/23 12/4/23   Playing Status: Full go  Full go         Exercise/Treatment     [place] 3x10     Tail wags 2x10     Cupping 5min low back

## 2024-01-21 ENCOUNTER — ATHLETIC TRAINING (OUTPATIENT)
Dept: SPORTS MEDICINE | Facility: OTHER | Age: 19
End: 2024-01-21

## 2024-01-21 DIAGNOSIS — S86.899S MEDIAL TIBIAL STRESS SYNDROME, SEQUELA: Primary | ICD-10-CM

## 2024-01-21 NOTE — PROGRESS NOTES
Athletic Training Progress Note    Name: Wayne Pina  Age: 18 y.o.     Assessment/Plan:     Visit Diagnosis: Medial tibial stress syndrome     Treatment Plan:     []  Follow-up PRN.   []  Follow-up prior to next practice/game for re-evaluation.  [x]  Daily treatment/rehab. Progress note expected weekly.     Subjective: Ath had a meet yesterday and states her shin pain has increased. She feels shin pain with walking around.     Objective:  See below.     Treatment Log:  Date: 1/21/23   Playing Status:        Exercise/Treatment    KAV[place] arches 3min    Heat + Stim 20min

## 2024-01-23 ENCOUNTER — ATHLETIC TRAINING (OUTPATIENT)
Dept: SPORTS MEDICINE | Facility: OTHER | Age: 19
End: 2024-01-23

## 2024-01-23 DIAGNOSIS — S86.899S MEDIAL TIBIAL STRESS SYNDROME, SEQUELA: Primary | ICD-10-CM

## 2024-01-23 NOTE — PROGRESS NOTES
Athletic Training Progress Note    Name: Wayne Pina  Age: 18 y.o.     Assessment/Plan:     Visit Diagnosis: Medial tibial stress syndrome     Treatment Plan:   []  Follow-up PRN.   []  Follow-up prior to next practice/game for re-evaluation.  [x]  Daily treatment/rehab. Progress note expected weekly.     Subjective: Ath states her shin pain has decreased. She states her pain is 1/10.     Objective:  See below.     Treatment Log:  Date: 1/23/24 1/21/24   Playing Status: As tolerated         Exercise/Treatment     4 way ankle 30x black band    Seated tib raises 3x10    Foam pick ups     IASTM arches  3min    Heat + Stim  20min      Arch taping for practice.

## 2024-01-30 ENCOUNTER — ATHLETIC TRAINING (OUTPATIENT)
Dept: SPORTS MEDICINE | Facility: OTHER | Age: 19
End: 2024-01-30

## 2024-01-30 DIAGNOSIS — S86.899S MEDIAL TIBIAL STRESS SYNDROME, SEQUELA: Primary | ICD-10-CM

## 2024-01-30 NOTE — PROGRESS NOTES
Athletic Training Progress Note    Name: Wayne Pina  Age: 18 y.o.     Assessment/Plan:     Visit Diagnosis: Medial tibial stress syndrome     Treatment Plan:   []  Follow-up PRN.   []  Follow-up prior to next practice/game for re-evaluation.  [x]  Daily treatment/rehab. Progress note expected weekly.     Subjective: Ath states her shin pain has decreased. She states her pain is 1/10.     Objective:  See below.     Treatment Log:  Date: 1/30/24 1/23/24 1/21/24   Playing Status: As tolerated  As tolerated          Exercise/Treatment      4 way ankle  30x black band    Seated tib raises  3x10    Foam pick ups      IASTM arches 3min  3min    Ice cup 5min B/L      Heat + Stim   20min      Arch taping for practice.

## 2024-02-06 ENCOUNTER — ATHLETIC TRAINING (OUTPATIENT)
Dept: SPORTS MEDICINE | Facility: OTHER | Age: 19
End: 2024-02-06

## 2024-02-06 DIAGNOSIS — M79.10 MUSCLE SORENESS: Primary | ICD-10-CM

## 2024-02-06 NOTE — PROGRESS NOTES
2/6/24  Ath requests IASTM on both arches, and cupping on her back. She is not competing this weekend.   Completed: IASTM, cupping on back 5min

## 2024-02-15 ENCOUNTER — ATHLETIC TRAINING (OUTPATIENT)
Dept: SPORTS MEDICINE | Facility: OTHER | Age: 19
End: 2024-02-15

## 2024-02-15 DIAGNOSIS — S86.899S MEDIAL TIBIAL STRESS SYNDROME, SEQUELA: Primary | ICD-10-CM

## 2024-02-15 DIAGNOSIS — M79.10 MUSCLE SORENESS: ICD-10-CM

## 2024-02-15 NOTE — PROGRESS NOTES
2/15/24  Ath states she is doing moderately better. Treatment consisted of IASTM on both arches, muscle relaxation on shins, and release on neck musculature.       2/6/24  Ath requests IASTM on both arches, and cupping on her back. She is not competing this weekend.   Completed: IASTM, cupping on back 5min

## 2024-02-20 ENCOUNTER — ATHLETIC TRAINING (OUTPATIENT)
Dept: SPORTS MEDICINE | Facility: OTHER | Age: 19
End: 2024-02-20

## 2024-02-20 DIAGNOSIS — S86.899S MEDIAL TIBIAL STRESS SYNDROME, SEQUELA: Primary | ICD-10-CM

## 2024-02-20 NOTE — PROGRESS NOTES
2/20/24  Ath states she is doing moderately better. Treatment consisted of muscle relaxation on shins with heat.    2/15/24  Ath states she is doing moderately better. Treatment consisted of IASTM on both arches, muscle relaxation on shins, and release on neck musculature.       2/6/24  Ath requests IASTM on both arches, and cupping on her back. She is not competing this weekend.   Completed: IASTM, cupping on back 5min

## 2024-02-21 ENCOUNTER — ATHLETIC TRAINING (OUTPATIENT)
Dept: SPORTS MEDICINE | Facility: OTHER | Age: 19
End: 2024-02-21
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
## 2024-02-21 NOTE — PROGRESS NOTES
2/21/24  Treatment consisted of: Hip hikes, Wall sit tib raises, Cupping on upper back.   BD ATC    2/20/24  Ath states she is doing moderately better. Treatment consisted of muscle relaxation on shins with heat.    2/15/24  Ath states she is doing moderately better. Treatment consisted of IASTM on both arches, muscle relaxation on shins, and release on neck musculature.       2/6/24  Ath requests IASTM on both arches, and cupping on her back. She is not competing this weekend.   Completed: IASTM, cupping on back 5min

## 2024-02-27 ENCOUNTER — ATHLETIC TRAINING (OUTPATIENT)
Dept: SPORTS MEDICINE | Facility: OTHER | Age: 19
End: 2024-02-27

## 2024-02-27 DIAGNOSIS — S86.899S MEDIAL TIBIAL STRESS SYNDROME, SEQUELA: Primary | ICD-10-CM

## 2024-02-27 NOTE — PROGRESS NOTES
2/27  Ath requests IASTM on both arches, and heat with electric stim on medial tibias. 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 Ath reported doing better and is still completing previously prescribed maintenance rehab exercises on her own.  Completed:IASTM on both arches followed by heat and electric stim for 20 minutes.   PM ATS  CY LAT ATC      2/20/24  Ath states she is doing moderately better. Treatment consisted of muscle relaxation on shins with heat.    2/15/24  Ath states she is doing moderately better. Treatment consisted of IASTM on both arches, muscle relaxation on shins, and release on neck musculature.       2/6/24  Ath requests IASTM on both arches, and cupping on her back. She is not competing this weekend.   Completed: IASTM, cupping on back 5min

## 2024-02-28 ENCOUNTER — ATHLETIC TRAINING (OUTPATIENT)
Dept: SPORTS MEDICINE | Facility: OTHER | Age: 19
End: 2024-02-28

## 2024-02-28 DIAGNOSIS — M79.10 MUSCLE SORENESS: Primary | ICD-10-CM

## 2024-02-29 NOTE — PROGRESS NOTES
2/28  Ath completed back squats for the first time since November and states her upper back is sore/tight.   Completed: IASTM on both shins, cupping on upper back     2/27  Ath requests IASTM on both arches, and heat with electric stim on medial tibias. 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 Ath reported doing better and is still completing previously prescribed maintenance rehab exercises on her own.  Completed:IASTM on both arches followed by heat and electric stim for 20 minutes.   PM ATS  CY LAT ATC      2/20/24  Ath states she is doing moderately better. Treatment consisted of muscle relaxation on shins with heat.    2/15/24  Ath states she is doing moderately better. Treatment consisted of IASTM on both arches, muscle relaxation on shins, and release on neck musculature.       2/6/24  Ath requests IASTM on both arches, and cupping on her back. She is not competing this weekend.   Completed: IASTM, cupping on back 5min

## 2024-03-25 ENCOUNTER — ATHLETIC TRAINING (OUTPATIENT)
Dept: SPORTS MEDICINE | Facility: OTHER | Age: 19
End: 2024-03-25

## 2024-03-25 DIAGNOSIS — S86.899S MEDIAL TIBIAL STRESS SYNDROME, SEQUELA: Primary | ICD-10-CM

## 2024-03-25 NOTE — PROGRESS NOTES
"3/25/24  Ath comes in for treatment of B/L shins. She states they have both felt great and doesn't describe it as pain, but sometimes \"burning\".   Today she completed: Hip hikes, SL balance jaswinder disc, [place] pick ups 2x, IASTM on B/L arches  BD ATC   "

## 2024-03-26 ENCOUNTER — ATHLETIC TRAINING (OUTPATIENT)
Dept: SPORTS MEDICINE | Facility: OTHER | Age: 19
End: 2024-03-26

## 2024-03-26 DIAGNOSIS — S86.899S MEDIAL TIBIAL STRESS SYNDROME, SEQUELA: Primary | ICD-10-CM

## 2024-03-26 NOTE — PROGRESS NOTES
"3/26/24  Ath comes in for TENS and heat on B/L shins.   BD     3/25/24  Ath comes in for treatment of B/L shins. She states they have both felt great and doesn't describe it as pain, but sometimes \"burning\".   Today she completed: Hip hikes, SL balance jaswinder disc, [place] pick ups 2x, IASTM on B/L arches  BD ATC   "

## 2024-04-02 ENCOUNTER — ATHLETIC TRAINING (OUTPATIENT)
Dept: SPORTS MEDICINE | Facility: OTHER | Age: 19
End: 2024-04-02

## 2024-04-02 DIAGNOSIS — M62.89 MUSCLE TIGHTNESS: Primary | ICD-10-CM

## 2024-04-02 NOTE — PROGRESS NOTES
"4/2  Ath comes in for IASTM on B/L arches and cupping on back.   BD     3/26/24  Ath comes in for TENS and heat on B/L shins.   BD     3/25/24  Ath comes in for treatment of B/L shins. She states they have both felt great and doesn't describe it as pain, but sometimes \"burning\".   Today she completed: Hip hikes, SL balance jaswinder disc, [place] pick ups 2x, IASTM on B/L arches  BD ATC   "

## 2024-04-03 ENCOUNTER — ATHLETIC TRAINING (OUTPATIENT)
Dept: SPORTS MEDICINE | Facility: OTHER | Age: 19
End: 2024-04-03

## 2024-04-03 DIAGNOSIS — S86.899S MEDIAL TIBIAL STRESS SYNDROME, SEQUELA: Primary | ICD-10-CM

## 2024-04-03 NOTE — PROGRESS NOTES
"4/3  Ath comes in for strengthening of B/L MTSS.   4 way ankle  Heel taps  Hip hikes  Banded toe walks   Muscle relaxation with heat 20min  BD    4/2  Ath comes in for IASTM on B/L arches and cupping on back.   BD     3/26/24  Ath comes in for TENS and heat on B/L shins.   BD     3/25/24  Ath comes in for treatment of B/L shins. She states they have both felt great and doesn't describe it as pain, but sometimes \"burning\".   Today she completed: Hip hikes, SL balance jaswinder disc, [place] pick ups 2x, IASTM on B/L arches  BD ATC   "

## 2024-04-11 ENCOUNTER — ATHLETIC TRAINING (OUTPATIENT)
Dept: SPORTS MEDICINE | Facility: OTHER | Age: 19
End: 2024-04-11

## 2024-04-11 DIAGNOSIS — S86.899S MEDIAL TIBIAL STRESS SYNDROME, SEQUELA: Primary | ICD-10-CM

## 2024-04-11 NOTE — PROGRESS NOTES
4/11  Ath comes in for strengthening of B/L MTSS. She does not have any new complaints with her injury. She states her arches have been bothering her more than the shins. She completed tib raises and calf raises during lift.   Towel scrunches 3#   IASTM B/L arches   Ice cup for shins.   B/L great toe tape pulling laterally.   BD        4/3  Ath comes in for strengthening of B/L MTSS.   4 way ankle  Heel taps  Hip hikes  Banded toe walks   Muscle relaxation with heat 20min  BD    4/2  Ath comes in for IASTM on B/L arches and cupping on back.   BD

## 2024-04-23 ENCOUNTER — ATHLETIC TRAINING (OUTPATIENT)
Dept: SPORTS MEDICINE | Facility: OTHER | Age: 19
End: 2024-04-23

## 2024-04-23 DIAGNOSIS — M62.89 MUSCLE TIGHTNESS: Primary | ICD-10-CM

## 2024-04-23 NOTE — PROGRESS NOTES
4/23  Ath states her back feels tight. Completed stabilization exercises and stretching. Advised to begin core workouts.   BD

## 2024-04-25 ENCOUNTER — ATHLETIC TRAINING (OUTPATIENT)
Dept: SPORTS MEDICINE | Facility: OTHER | Age: 19
End: 2024-04-25

## 2024-04-25 DIAGNOSIS — M62.89 MUSCLE TIGHTNESS: Primary | ICD-10-CM

## 2024-04-25 NOTE — PROGRESS NOTES
4/25  Ath requests cupping on her back, and stim with heat on shins.   BD     4/23  Ath states her back feels tight. Completed stabilization exercises and stretching. Advised to begin core workouts.   BD

## 2024-04-30 ENCOUNTER — ATHLETIC TRAINING (OUTPATIENT)
Dept: SPORTS MEDICINE | Facility: OTHER | Age: 19
End: 2024-04-30

## 2024-04-30 DIAGNOSIS — S86.899S MEDIAL TIBIAL STRESS SYNDROME, SEQUELA: Primary | ICD-10-CM

## 2024-04-30 NOTE — PROGRESS NOTES
4/30  Ath completed IASTM on B/L calves and muscle relaxation with heat on B/L MTSS.     4/11  Ath comes in for strengthening of B/L MTSS. She does not have any new complaints with her injury. She states her arches have been bothering her more than the shins. She completed tib raises and calf raises during lift.   Towel scrunches 3#   IASTM B/L arches   Ice cup for shins.   B/L great toe tape pulling laterally.   BD    4/3  Ath comes in for strengthening of B/L MTSS.   4 way ankle  Heel taps  Hip hikes  Banded toe walks   Muscle relaxation with heat 20min  BD    4/2  Ath comes in for IASTM on B/L arches and cupping on back.   BD

## 2024-05-07 ENCOUNTER — ATHLETIC TRAINING (OUTPATIENT)
Dept: SPORTS MEDICINE | Facility: OTHER | Age: 19
End: 2024-05-07

## 2024-05-07 DIAGNOSIS — M62.89 MUSCLE TIGHTNESS: ICD-10-CM

## 2024-05-07 DIAGNOSIS — S86.899S MEDIAL TIBIAL STRESS SYNDROME, SEQUELA: Primary | ICD-10-CM

## 2024-05-07 NOTE — PROGRESS NOTES
5/7  Ath is finished with her outdoor track and field season. She requests cupping on her back, along IASTM on B/L ant tib, and arches. She will contact AT over summer if anything new occurs or she needs progression with exercises.     WICHO ATC

## 2024-08-28 ENCOUNTER — ATHLETIC TRAINING (OUTPATIENT)
Dept: SPORTS MEDICINE | Facility: OTHER | Age: 19
End: 2024-08-28
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
## 2024-08-28 NOTE — PROGRESS NOTES
8/28/24  Ath comes in for continued strengthening and prevention of her lower extremities. She has had MTSS in the past but states it has not returned and she would like to get ahead of it.   4 way ankle  Ant tib raises  IASTM completed B/L plantar surfaces. She continues to wear her toe spacers.

## 2024-09-05 ENCOUNTER — ATHLETIC TRAINING (OUTPATIENT)
Dept: SPORTS MEDICINE | Facility: OTHER | Age: 19
End: 2024-09-05
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
## 2024-09-05 NOTE — PROGRESS NOTES
9/5/24  Ath states on her run yesterday her shins started to burn along with her arches. She took her shoes off for the last lap and walked the last lap. Also c/o right upper trap tightness.   Completed: [place] pickups, Anterior tib raises, Hip hikes, cupping.     8/28/24  Ath comes in for continued strengthening and prevention of her lower extremities. She has had MTSS in the past but states it has not returned and she would like to get ahead of it.   4 way ankle  Ant tib raises  IASTM completed B/L plantar surfaces. She continues to wear her toe spacers.

## 2024-09-15 ENCOUNTER — ATHLETIC TRAINING (OUTPATIENT)
Dept: SPORTS MEDICINE | Facility: OTHER | Age: 19
End: 2024-09-15
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
## 2024-09-15 NOTE — PROGRESS NOTES
9/15  Ath completed lower extremity exercises for her shins on her own. IASTM was completed on her right anterior deltoid muscle. She reports having pain in that area from a flu shot that was given 2 weeks ago. AT advised her to continue moving arm and to stop avoiding motions that bother it.     9/5/24  Ath states on her run yesterday her shins started to burn along with her arches. She took her shoes off for the last lap and walked the last lap. Also c/o right upper trap tightness.   Completed: [place] pickups, Anterior tib raises, Hip hikes, cupping.     8/28/24  Ath comes in for continued strengthening and prevention of her lower extremities. She has had MTSS in the past but states it has not returned and she would like to get ahead of it.   4 way ankle  Ant tib raises  IASTM completed B/L plantar surfaces. She continues to wear her toe spacers.

## 2024-09-26 ENCOUNTER — ATHLETIC TRAINING (OUTPATIENT)
Dept: SPORTS MEDICINE | Facility: OTHER | Age: 19
End: 2024-09-26
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
## 2024-09-26 NOTE — PROGRESS NOTES
9/26  Ath reports pain in both feet but denies shin pain. She also states her arm felt better for a week after treatment, but is starting to feel slight discomfort in the same spot again.   Completed: Toe yoga, PF ball roll, IASTM complete B/L plantar surfaces. IASTM on right anterior deltoid muscle.       9/15  Ath completed lower extremity exercises for her shins on her own. IASTM was completed on her right anterior deltoid muscle. She reports having pain in that area from a flu shot that was given 2 weeks ago. AT advised her to continue moving arm and to stop avoiding motions that bother it.     9/5/24  Ath states on her run yesterday her shins started to burn along with her arches. She took her shoes off for the last lap and walked the last lap. Also c/o right upper trap tightness.   Completed: [place] pickups, Anterior tib raises, Hip hikes, cupping.     8/28/24  Ath comes in for continued strengthening and prevention of her lower extremities. She has had MTSS in the past but states it has not returned and she would like to get ahead of it.   4 way ankle  Ant tib raises  IASTM completed B/L plantar surfaces. She continues to wear her toe spacers.

## 2024-09-30 ENCOUNTER — ATHLETIC TRAINING (OUTPATIENT)
Dept: SPORTS MEDICINE | Facility: OTHER | Age: 19
End: 2024-09-30
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
## 2024-09-30 NOTE — PROGRESS NOTES
9/30  Ath reports for continued strengthening of lower extremity. She denies shin pain, but reports feeling pressure on the outside of both feet. She is wearing a toe spacer and thinks that is helping.   Completed: Foam houses, Resisted side steps, [place]le[place] B/L anterior tibialis musculature.       9/26  Ath reports pain in both feet but denies shin pain. She also states her arm felt better for a week after treatment, but is starting to feel slight discomfort in the same spot again.   Completed: Toe yoga, PF ball roll, IASTM complete B/L plantar surfaces. IASTM on right anterior deltoid muscle.       9/15  Ath completed lower extremity exercises for her shins on her own. IASTM was completed on her right anterior deltoid muscle. She reports having pain in that area from a flu shot that was given 2 weeks ago. AT advised her to continue moving arm and to stop avoiding motions that bother it.     9/5/24  Ath states on her run yesterday her shins started to burn along with her arches. She took her shoes off for the last lap and walked the last lap. Also c/o right upper trap tightness.   Completed: [place] pickups, Anterior tib raises, Hip hikes, cupping.     8/28/24  Ath comes in for continued strengthening and prevention of her lower extremities. She has had MTSS in the past but states it has not returned and she would like to get ahead of it.   4 way ankle  Ant tib raises  IASTM completed B/L plantar surfaces. She continues to wear her toe spacers.

## 2024-10-04 ENCOUNTER — ATHLETIC TRAINING (OUTPATIENT)
Dept: SPORTS MEDICINE | Facility: OTHER | Age: 19
End: 2024-10-04
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
## 2024-10-04 NOTE — PROGRESS NOTES
10/4  Ath reports to ATC for continued strengthening of lower extremity. She denies shin pain, but reports feeling pain on the bottoms of her feet. She only feels this during warm ups. She continues to wear her toe spacer.   Completed: DL Calf raises, Calf raise two up down one slow eccentric heel lowering with 10#, Towel scrunches, IASTM B/L feet.       9/30  Ath reports for continued strengthening of lower extremity. She denies shin pain, but reports feeling pressure on the outside of both feet. She is wearing a toe spacer and thinks that is helping.   Completed: Foam houses, Resisted side steps, Effleurage B/L anterior tibialis musculature.     9/26  Ath reports pain in both feet but denies shin pain. She also states her arm felt better for a week after treatment, but is starting to feel slight discomfort in the same spot again.   Completed: Toe yoga, PF ball roll, IASTM complete B/L plantar surfaces. IASTM on right anterior deltoid muscle.       9/15  Ath completed lower extremity exercises for her shins on her own. IASTM was completed on her right anterior deltoid muscle. She reports having pain in that area from a flu shot that was given 2 weeks ago. AT advised her to continue moving arm and to stop avoiding motions that bother it.     9/5/24  Ath states on her run yesterday her shins started to burn along with her arches. She took her shoes off for the last lap and walked the last lap. Also c/o right upper trap tightness.   Completed: [place] pickups, Anterior tib raises, Hip hikes, cupping.     8/28/24  Ath comes in for continued strengthening and prevention of her lower extremities. She has had MTSS in the past but states it has not returned and she would like to get ahead of it.   4 way ankle  Ant tib raises  IASTM completed B/L plantar surfaces. She continues to wear her toe spacers.

## 2024-10-08 ENCOUNTER — ATHLETIC TRAINING (OUTPATIENT)
Dept: SPORTS MEDICINE | Facility: OTHER | Age: 19
End: 2024-10-08
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
## 2024-10-09 NOTE — PROGRESS NOTES
10/8  Completed stretching on her own followed by pre-mod and heat on B/L shins.     10/4  Ath reports to ATC for continued strengthening of lower extremity. She denies shin pain, but reports feeling pain on the bottoms of her feet. She only feels this during warm ups. She continues to wear her toe spacer.   Completed: DL Calf raises, Calf raise two up down one slow eccentric heel lowering with 10#, Towel scrunches, IASTM B/L feet.       9/30  Ath reports for continued strengthening of lower extremity. She denies shin pain, but reports feeling pressure on the outside of both feet. She is wearing a toe spacer and thinks that is helping.   Completed: Foam houses, Resisted side steps, Effleurage B/L anterior tibialis musculature.     9/26  Ath reports pain in both feet but denies shin pain. She also states her arm felt better for a week after treatment, but is starting to feel slight discomfort in the same spot again.   Completed: Toe yoga, PF ball roll, IASTM complete B/L plantar surfaces. IASTM on right anterior deltoid muscle.       9/15  Ath completed lower extremity exercises for her shins on her own. IASTM was completed on her right anterior deltoid muscle. She reports having pain in that area from a flu shot that was given 2 weeks ago. AT advised her to continue moving arm and to stop avoiding motions that bother it.     9/5/24  Ath states on her run yesterday her shins started to burn along with her arches. She took her shoes off for the last lap and walked the last lap. Also c/o right upper trap tightness.   Completed: [place] pickups, Anterior tib raises, Hip hikes, cupping.     8/28/24  Ath comes in for continued strengthening and prevention of her lower extremities. She has had MTSS in the past but states it has not returned and she would like to get ahead of it.   4 way ankle  Ant tib raises  IASTM completed B/L plantar surfaces. She continues to wear her toe spacers.

## 2024-10-10 ENCOUNTER — ATHLETIC TRAINING (OUTPATIENT)
Dept: SPORTS MEDICINE | Facility: OTHER | Age: 19
End: 2024-10-10
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
## 2024-10-10 NOTE — PROGRESS NOTES
10/10  MHP, IASTM on B/L plantar surfaces, gastroc and foot intrinsic exercises completed. Tolerated well.     10/8  Completed stretching on her own followed by pre-mod and heat on B/L shins.     10/4  Ath reports to ATC for continued strengthening of lower extremity. She denies shin pain, but reports feeling pain on the bottoms of her feet. She only feels this during warm ups. She continues to wear her toe spacer.   Completed: DL Calf raises, Calf raise two up down one slow eccentric heel lowering with 10#, Towel scrunches, IASTM B/L feet.       9/30  Ath reports for continued strengthening of lower extremity. She denies shin pain, but reports feeling pressure on the outside of both feet. She is wearing a toe spacer and thinks that is helping.   Completed: Foam houses, Resisted side steps, Effleurage B/L anterior tibialis musculature.     9/26  Ath reports pain in both feet but denies shin pain. She also states her arm felt better for a week after treatment, but is starting to feel slight discomfort in the same spot again.   Completed: Toe yoga, PF ball roll, IASTM complete B/L plantar surfaces. IASTM on right anterior deltoid muscle.       9/15  Ath completed lower extremity exercises for her shins on her own. IASTM was completed on her right anterior deltoid muscle. She reports having pain in that area from a flu shot that was given 2 weeks ago. AT advised her to continue moving arm and to stop avoiding motions that bother it.     9/5/24  Ath states on her run yesterday her shins started to burn along with her arches. She took her shoes off for the last lap and walked the last lap. Also c/o right upper trap tightness.   Completed: [place] pickups, Anterior tib raises, Hip hikes, cupping.     8/28/24  Ath comes in for continued strengthening and prevention of her lower extremities. She has had MTSS in the past but states it has not returned and she would like to get ahead of it.   4 way ankle  Ant tib raises  IASTM  completed B/L plantar surfaces. She continues to wear her toe spacers.

## 2024-10-15 ENCOUNTER — ATHLETIC TRAINING (OUTPATIENT)
Dept: SPORTS MEDICINE | Facility: OTHER | Age: 19
End: 2024-10-15
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
## 2024-10-15 NOTE — PROGRESS NOTES
10/15  Ath does not have any new complaints regarding her shins or feet. She continues to wear her toe spacer.    Completed: [place] pickups, Towel calf raises, Pre-mod with Heat     10/10  MHP, IASTM on B/L plantar surfaces, gastroc and foot intrinsic exercises completed. Tolerated well.     10/8  Completed stretching on her own followed by pre-mod and heat on B/L shins.     10/4  Ath reports to [facility] for continued strengthening of lower extremity. She denies shin pain, but reports feeling pain on the bottoms of her feet. She only feels this during warm ups. She continues to wear her toe spacer.   Completed: DL Calf raises, Calf raise two up down one slow eccentric heel lowering with 10#, Towel scrunches, IASTM B/L feet.       9/30  Ath reports for continued strengthening of lower extremity. She denies shin pain, but reports feeling pressure on the outside of both feet. She is wearing a toe spacer and thinks that is helping.   Completed: Foam houses, Resisted side steps, Effleurage B/L anterior tibialis musculature.     9/26  Ath reports pain in both feet but denies shin pain. She also states her arm felt better for a week after treatment, but is starting to feel slight discomfort in the same spot again.   Completed: Toe yoga, PF ball roll, IASTM complete B/L plantar surfaces. IASTM on right anterior deltoid muscle.       9/15  Ath completed lower extremity exercises for her shins on her own. IASTM was completed on her right anterior deltoid muscle. She reports having pain in that area from a flu shot that was given 2 weeks ago. AT advised her to continue moving arm and to stop avoiding motions that bother it.     9/5/24  Ath states on her run yesterday her shins started to burn along with her arches. She took her shoes off for the last lap and walked the last lap. Also c/o right upper trap tightness.   Completed: [place] pickups, Anterior tib raises, Hip hikes, cupping.     8/28/24  Ath comes in for continued  strengthening and prevention of her lower extremities. She has had MTSS in the past but states it has not returned and she would like to get ahead of it.   4 way ankle  Ant tib raises  IASTM completed B/L plantar surfaces. She continues to wear her toe spacers.

## 2024-11-02 ENCOUNTER — ATHLETIC TRAINING (OUTPATIENT)
Dept: SPORTS MEDICINE | Facility: OTHER | Age: 19
End: 2024-11-02
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
## 2024-11-02 NOTE — PROGRESS NOTES
"11/2  Ath has been sick the past week so she hasn't been in. She reports during her workout yesterday she felt a \"burning\" sensation during the first two laps but then it went away. She says this has been normal for her during her warmups. Today she requests cupping on her left upper trap, left quad, and IASTM on B/L plantar surfaces.     She continues to complete MTSS exercises on her own.     Completed: Stationary cupping on upper trap, Stationary cupping on left quadricep, IASTM b/l plantar surfaces, heat and stim on B/L shins.     10/15  Ath does not have any new complaints regarding her shins or feet. She continues to wear her toe spacer.    Completed: [place] pickups, Towel calf raises, Pre-mod with Heat     10/10  MHP, IASTM on B/L plantar surfaces, gastroc and foot intrinsic exercises completed. Tolerated well.     10/8  Completed stretching on her own followed by pre-mod and heat on B/L shins.     10/4  Ath reports to [facility] for continued strengthening of lower extremity. She denies shin pain, but reports feeling pain on the bottoms of her feet. She only feels this during warm ups. She continues to wear her toe spacer.   Completed: DL Calf raises, Calf raise two up down one slow eccentric heel lowering with 10#, Towel scrunches, IASTM B/L feet.       9/30  Ath reports for continued strengthening of lower extremity. She denies shin pain, but reports feeling pressure on the outside of both feet. She is wearing a toe spacer and thinks that is helping.   Completed: Foam houses, Resisted side steps, Effleurage B/L anterior tibialis musculature.     9/26  Ath reports pain in both feet but denies shin pain. She also states her arm felt better for a week after treatment, but is starting to feel slight discomfort in the same spot again.   Completed: Toe yoga, PF ball roll, IASTM complete B/L plantar surfaces. IASTM on right anterior deltoid muscle.       9/15  Ath completed lower extremity exercises for her shins on her " own. IASTM was completed on her right anterior deltoid muscle. She reports having pain in that area from a flu shot that was given 2 weeks ago. AT advised her to continue moving arm and to stop avoiding motions that bother it.     9/5/24  Ath states on her run yesterday her shins started to burn along with her arches. She took her shoes off for the last lap and walked the last lap. Also c/o right upper trap tightness.   Completed: [place] pickups, Anterior tib raises, Hip hikes, cupping.     8/28/24  Ath comes in for continued strengthening and prevention of her lower extremities. She has had MTSS in the past but states it has not returned and she would like to get ahead of it.   4 way ankle  Ant tib raises  IASTM completed B/L plantar surfaces. She continues to wear her toe spacers.

## 2024-11-05 ENCOUNTER — ATHLETIC TRAINING (OUTPATIENT)
Dept: SPORTS MEDICINE | Facility: OTHER | Age: 19
End: 2024-11-05
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
## 2024-11-05 NOTE — PROGRESS NOTES
"11/5  Coleman reports having left hamstring tightness. She is limited on her left side compared to right about ~15deg.   MHP, Hamstring mobility, Posterior rotation corrected with METS.     11/2  Coleman has been sick the past week so she hasn't been in. She reports during her workout yesterday she felt a \"burning\" sensation during the first two laps but then it went away. She says this has been normal for her during her warmups. Today she requests cupping on her left upper trap, left quad, and IASTM on B/L plantar surfaces.     She continues to complete MTSS exercises on her own.     Completed: Stationary cupping on upper trap, Stationary cupping on left quadricep, IASTM b/l plantar surfaces, heat and stim on B/L shins.     10/15  Coleman does not have any new complaints regarding her shins or feet. She continues to wear her toe spacer.    Completed: [place] pickups, Towel calf raises, Pre-mod with Heat     10/10  MHP, IASTM on B/L plantar surfaces, gastroc and foot intrinsic exercises completed. Tolerated well.     10/8  Completed stretching on her own followed by pre-mod and heat on B/L shins.     10/4  Coleman reports to [facility] for continued strengthening of lower extremity. She denies shin pain, but reports feeling pain on the bottoms of her feet. She only feels this during warm ups. She continues to wear her toe spacer.   Completed: DL Calf raises, Calf raise two up down one slow eccentric heel lowering with 10#, Towel scrunches, IASTM B/L feet.   "

## 2024-11-19 ENCOUNTER — ATHLETIC TRAINING (OUTPATIENT)
Dept: SPORTS MEDICINE | Facility: OTHER | Age: 19
End: 2024-11-19
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
## 2024-11-19 NOTE — PROGRESS NOTES
"11/19  Coleman has not been working out recently due to potential new diagnosis of anemia. She is getting blood work done this Saturday to find out more information.   Completed: Seated stool resisted inversion, Hip hikes, Effleurage B/L anterior tib muscualture    11/5  Coleman reports having left hamstring tightness. She is limited on her left side compared to right about ~15deg.   MHP, Hamstring mobility, Posterior rotation corrected with METS.     11/2  Coleman has been sick the past week so she hasn't been in. She reports during her workout yesterday she felt a \"burning\" sensation during the first two laps but then it went away. She says this has been normal for her during her warmups. Today she requests cupping on her left upper trap, left quad, and IASTM on B/L plantar surfaces.     She continues to complete MTSS exercises on her own.     Completed: Stationary cupping on upper trap, Stationary cupping on left quadricep, IASTM b/l plantar surfaces, heat and stim on B/L shins.     10/15  Coleman does not have any new complaints regarding her shins or feet. She continues to wear her toe spacer.    Completed: [place] pickups, Towel calf raises, Pre-mod with Heat     10/10  MHP, IASTM on B/L plantar surfaces, gastroc and foot intrinsic exercises completed. Tolerated well.     10/8  Completed stretching on her own followed by pre-mod and heat on B/L shins.     10/4  Coleman reports to [facility] for continued strengthening of lower extremity. She denies shin pain, but reports feeling pain on the bottoms of her feet. She only feels this during warm ups. She continues to wear her toe spacer.   Completed: DL Calf raises, Calf raise two up down one slow eccentric heel lowering with 10#, Towel scrunches, IASTM B/L feet.   "

## 2024-12-10 ENCOUNTER — ATHLETIC TRAINING (OUTPATIENT)
Dept: SPORTS MEDICINE | Facility: OTHER | Age: 19
End: 2024-12-10

## 2024-12-10 DIAGNOSIS — M62.89 MUSCLE TIGHTNESS: Primary | ICD-10-CM

## 2024-12-10 NOTE — PROGRESS NOTES
12/10  Ath comes in for treatment of her neck. She hasn't been working out due to having low vitamin D levels. She is taking new medication to help this. Ath reports having tight neck musculature.   Completed: MHP, IASTM, Stretching     11/19  Ath has not been working out recently due to potential new diagnosis of anemia. She is getting blood work done this Saturday to find out more information.   Completed: Seated stool resisted inversion, Hip hikes, Effleurage B/L anterior tib muscualture

## 2025-01-21 ENCOUNTER — ATHLETIC TRAINING (OUTPATIENT)
Dept: SPORTS MEDICINE | Facility: OTHER | Age: 20
End: 2025-01-21

## 2025-01-21 DIAGNOSIS — M62.89 MUSCLE TIGHTNESS: Primary | ICD-10-CM

## 2025-01-21 NOTE — PROGRESS NOTES
1/21  Ath comes in for maintenance of her lower extremities. She had a track meet over the weekend and states her hamstrings felt tight after. She has no complaints of shin pain or arch pain.   Completed: 7min on bike, B/L hamstring IASTM, Cupping on mid back.    12/10  Ath comes in for treatment of her neck. She hasn't been working out due to having low vitamin D levels. She is taking new medication to help this. Ath reports having tight neck musculature.   Completed: MHP, IASTM, Stretching     11/19  Ath has not been working out recently due to potential new diagnosis of anemia. She is getting blood work done this Saturday to find out more information.   Completed: Seated stool resisted inversion, Hip hikes, Effleurage B/L anterior tib muscualture

## 2025-01-23 ENCOUNTER — ATHLETIC TRAINING (OUTPATIENT)
Dept: SPORTS MEDICINE | Facility: OTHER | Age: 20
End: 2025-01-23

## 2025-01-23 DIAGNOSIS — M62.89 MUSCLE TIGHTNESS: Primary | ICD-10-CM

## 2025-01-23 NOTE — PROGRESS NOTES
1/23  Ath comes in for continued maintenance of LE. Her hamstring feels less tight today, but she still has some discomfort/tightness along her distal hamstring tendons.   Completed: Bike 5min, IASTM distal hamstring, Heat and stim on shins    1/21  Ath comes in for maintenance of her lower extremities. She had a track meet over the weekend and states her hamstrings felt tight after. She has no complaints of shin pain or arch pain.   Completed: 7min on bike, B/L hamstring IASTM, Cupping on mid back.    12/10  Ath comes in for treatment of her neck. She hasn't been working out due to having low vitamin D levels. She is taking new medication to help this. Ath reports having tight neck musculature.   Completed: MHP, IASTM, Stretching     11/19  Ath has not been working out recently due to potential new diagnosis of anemia. She is getting blood work done this Saturday to find out more information.   Completed: Seated stool resisted inversion, Hip hikes, Effleurage B/L anterior tib muscualture

## 2025-02-14 ENCOUNTER — ATHLETIC TRAINING (OUTPATIENT)
Dept: SPORTS MEDICINE | Facility: OTHER | Age: 20
End: 2025-02-14

## 2025-02-14 DIAGNOSIS — M54.50 ACUTE BILATERAL LOW BACK PAIN WITHOUT SCIATICA: Primary | ICD-10-CM

## 2025-02-14 NOTE — PROGRESS NOTES
2/14/25: pt came in for appointment, requested cupping on low back and traps, then ended with trigger release on traps

## 2025-02-21 ENCOUNTER — ATHLETIC TRAINING (OUTPATIENT)
Dept: SPORTS MEDICINE | Facility: OTHER | Age: 20
End: 2025-02-21

## 2025-02-21 DIAGNOSIS — M54.6 ACUTE BILATERAL THORACIC BACK PAIN: Primary | ICD-10-CM

## 2025-02-21 NOTE — PROGRESS NOTES
2/21/25: pt came in for appointment, started with heat then trigger point was completed on trap/neck then lat

## 2025-03-14 ENCOUNTER — ATHLETIC TRAINING (OUTPATIENT)
Dept: SPORTS MEDICINE | Facility: OTHER | Age: 20
End: 2025-03-14

## 2025-03-14 DIAGNOSIS — M25.511 ACUTE PAIN OF RIGHT SHOULDER: Primary | ICD-10-CM

## 2025-03-21 ENCOUNTER — ATHLETIC TRAINING (OUTPATIENT)
Dept: SPORTS MEDICINE | Facility: OTHER | Age: 20
End: 2025-03-21

## 2025-03-21 DIAGNOSIS — M62.89 TIGHTNESS OF FASCIA: Primary | ICD-10-CM

## 2025-03-28 ENCOUNTER — ATHLETIC TRAINING (OUTPATIENT)
Dept: SPORTS MEDICINE | Facility: OTHER | Age: 20
End: 2025-03-28

## 2025-03-28 DIAGNOSIS — M62.89 TIGHTNESS OF FASCIA: Primary | ICD-10-CM

## 2025-03-28 NOTE — PROGRESS NOTES
3/28/25: pt came in for appointment, started with heat and trigger release was completed, also did soft tissues on back, the stretched hamstrings and finished with re-aligning hips

## 2025-04-18 ENCOUNTER — ATHLETIC TRAINING (OUTPATIENT)
Dept: SPORTS MEDICINE | Facility: OTHER | Age: 20
End: 2025-04-18

## 2025-04-18 DIAGNOSIS — M62.89 TIGHTNESS OF FASCIA: Primary | ICD-10-CM

## 2025-04-18 NOTE — PROGRESS NOTES
4/18/25:  Pt came in for appointment, started with heat and trigger release was completed on B/L trapezius musculature. Gliding cupping completed on low back musculature.    3/28/25: pt came in for appointment, started with heat and trigger release was completed, also did soft tissues on back, the stretched hamstrings and finished with re-aligning hips

## 2025-04-22 ENCOUNTER — ATHLETIC TRAINING (OUTPATIENT)
Dept: SPORTS MEDICINE | Facility: OTHER | Age: 20
End: 2025-04-22

## 2025-04-22 DIAGNOSIS — M62.89 TIGHTNESS OF FASCIA: Primary | ICD-10-CM

## 2025-04-22 NOTE — PROGRESS NOTES
4/22/25  Completed electrical stimulation B/L lower legs and IASTM on plantar surfaces.     4/18/25:  Pt came in for appointment, started with heat and trigger release was completed on B/L trapezius musculature. Gliding cupping completed on low back musculature.    3/28/25: pt came in for appointment, started with heat and trigger release was completed, also did soft tissues on back, the stretched hamstrings and finished with re-aligning hips

## 2025-04-24 ENCOUNTER — ATHLETIC TRAINING (OUTPATIENT)
Dept: SPORTS MEDICINE | Facility: OTHER | Age: 20
End: 2025-04-24

## 2025-04-24 DIAGNOSIS — M62.89 TIGHT FASCIA: Primary | ICD-10-CM

## 2025-04-28 ENCOUNTER — ATHLETIC TRAINING (OUTPATIENT)
Dept: SPORTS MEDICINE | Facility: OTHER | Age: 20
End: 2025-04-28

## 2025-04-28 DIAGNOSIS — M62.89 TIGHT FASCIA: Primary | ICD-10-CM

## 2025-04-28 NOTE — PROGRESS NOTES
4/28/24  Pt came in for continued soft tissue work.   Completed: MHP, IASTM B/L upper trapezius, Moving cupping along B/L lats  Stim and heat on B/L shins.     4/24/25: pt came in for appointment, started with heat then trigger release was completed